# Patient Record
Sex: FEMALE | Race: WHITE
[De-identification: names, ages, dates, MRNs, and addresses within clinical notes are randomized per-mention and may not be internally consistent; named-entity substitution may affect disease eponyms.]

---

## 2017-01-18 ENCOUNTER — HOSPITAL ENCOUNTER (EMERGENCY)
Dept: HOSPITAL 62 - ER | Age: 37
Discharge: HOME | End: 2017-01-18
Payer: OTHER GOVERNMENT

## 2017-01-18 VITALS — SYSTOLIC BLOOD PRESSURE: 113 MMHG | DIASTOLIC BLOOD PRESSURE: 75 MMHG

## 2017-01-18 DIAGNOSIS — Z3A.13: ICD-10-CM

## 2017-01-18 DIAGNOSIS — M54.2: ICD-10-CM

## 2017-01-18 DIAGNOSIS — O99.89: ICD-10-CM

## 2017-01-18 DIAGNOSIS — Z88.0: ICD-10-CM

## 2017-01-18 DIAGNOSIS — R22.0: ICD-10-CM

## 2017-01-18 DIAGNOSIS — O92.29: ICD-10-CM

## 2017-01-18 DIAGNOSIS — O26.891: Primary | ICD-10-CM

## 2017-01-18 DIAGNOSIS — R51: ICD-10-CM

## 2017-01-18 DIAGNOSIS — R10.814: ICD-10-CM

## 2017-01-18 DIAGNOSIS — H92.01: ICD-10-CM

## 2017-01-18 DIAGNOSIS — R10.9: ICD-10-CM

## 2017-01-18 DIAGNOSIS — R19.4: ICD-10-CM

## 2017-01-18 LAB
ALBUMIN SERPL-MCNC: 4.2 G/DL (ref 3.5–5)
ALP SERPL-CCNC: 85 U/L (ref 38–126)
ALT SERPL-CCNC: 37 U/L (ref 9–52)
ANION GAP SERPL CALC-SCNC: 13 MMOL/L (ref 5–19)
APPEARANCE UR: CLEAR
AST SERPL-CCNC: 28 U/L (ref 14–36)
BASOPHILS # BLD AUTO: 0 10^3/UL (ref 0–0.2)
BASOPHILS NFR BLD AUTO: 0.4 % (ref 0–2)
BILIRUB DIRECT SERPL-MCNC: 0 MG/DL (ref 0–0.3)
BILIRUB SERPL-MCNC: 0.4 MG/DL (ref 0.2–1.3)
BILIRUB UR QL STRIP: NEGATIVE
BUN SERPL-MCNC: 5 MG/DL (ref 7–20)
CALCIUM: 9.4 MG/DL (ref 8.4–10.2)
CHLORIDE SERPL-SCNC: 100 MMOL/L (ref 98–107)
CO2 SERPL-SCNC: 26 MMOL/L (ref 22–30)
CREAT SERPL-MCNC: 0.51 MG/DL (ref 0.52–1.25)
EOSINOPHIL # BLD AUTO: 0.3 10^3/UL (ref 0–0.6)
EOSINOPHIL NFR BLD AUTO: 3.1 % (ref 0–6)
ERYTHROCYTE [DISTWIDTH] IN BLOOD BY AUTOMATED COUNT: 13 % (ref 11.5–14)
GLUCOSE SERPL-MCNC: 96 MG/DL (ref 75–110)
GLUCOSE UR STRIP-MCNC: NEGATIVE MG/DL
HCT VFR BLD CALC: 39.3 % (ref 36–47)
HGB BLD-MCNC: 13.1 G/DL (ref 12–15.5)
HGB HCT DIFFERENCE: 0
KETONES UR STRIP-MCNC: 20 MG/DL
LIPASE SERPL-CCNC: 84.6 U/L (ref 23–300)
LYMPHOCYTES # BLD AUTO: 2 10^3/UL (ref 0.5–4.7)
LYMPHOCYTES NFR BLD AUTO: 20.6 % (ref 13–45)
MCH RBC QN AUTO: 30.7 PG (ref 27–33.4)
MCHC RBC AUTO-ENTMCNC: 33.3 G/DL (ref 32–36)
MCV RBC AUTO: 92 FL (ref 80–97)
MONOCYTES # BLD AUTO: 0.3 10^3/UL (ref 0.1–1.4)
MONOCYTES NFR BLD AUTO: 3.4 % (ref 3–13)
NEUTROPHILS # BLD AUTO: 7.1 10^3/UL (ref 1.7–8.2)
NEUTS SEG NFR BLD AUTO: 72.5 % (ref 42–78)
NITRITE UR QL STRIP: NEGATIVE
PH UR STRIP: 6 [PH] (ref 5–9)
POTASSIUM SERPL-SCNC: 3.7 MMOL/L (ref 3.6–5)
PROT SERPL-MCNC: 7.1 G/DL (ref 6.3–8.2)
PROT UR STRIP-MCNC: NEGATIVE MG/DL
RBC # BLD AUTO: 4.25 10^6/UL (ref 3.72–5.28)
SODIUM SERPL-SCNC: 138.6 MMOL/L (ref 137–145)
SP GR UR STRIP: 1.01
UROBILINOGEN UR-MCNC: NEGATIVE MG/DL (ref ?–2)
WBC # BLD AUTO: 9.8 10^3/UL (ref 4–10.5)

## 2017-01-18 PROCEDURE — 81001 URINALYSIS AUTO W/SCOPE: CPT

## 2017-01-18 PROCEDURE — 83690 ASSAY OF LIPASE: CPT

## 2017-01-18 PROCEDURE — 85025 COMPLETE CBC W/AUTO DIFF WBC: CPT

## 2017-01-18 PROCEDURE — 80053 COMPREHEN METABOLIC PANEL: CPT

## 2017-01-18 PROCEDURE — 99284 EMERGENCY DEPT VISIT MOD MDM: CPT

## 2017-01-18 PROCEDURE — 36415 COLL VENOUS BLD VENIPUNCTURE: CPT

## 2017-01-18 NOTE — ER DOCUMENT REPORT
ED Medical Screen (RME)





- General


Stated Complaint: RIGHT EAR PAIN,SIDE PAIN,LOOSE STOOLS


Mode of Arrival: Ambulatory


Information source: Patient


Notes: 


C/O abdominal pain with loose stools and right ear pain and swollen lymph node 

for the past couple days.  Denies fever vomiting.  Patient is 13 weeks 

pregnant.  Patient is due in , .  Denies vaginal bleeding, denies pain 

with void. Stools have foul smell order, denies recent antibiotic use,no 

history of C. Diff.





I have greeted and performed a rapid initial assessment of this patient.  A 

comprehensive ED assessment and evaluation of the patient, analysis of test 

results and completion of the medical decision making process will be conducted 

by additional ED providers.


TRAVEL OUTSIDE OF THE [place] IN LAST 30 DAYS: No





- Related Data


Allergies/Adverse Reactions: 


 





Penicillins Allergy (Verified 16 16:01)


 











Past Medical History


Neurological Medical History: Reports: Hx Seizures


Psychiatric Medical History: Reports: Hx Depression


Past Surgical History: Reports: Hx Gynecologic Surgery - right ovary tumor 

removed





Physical Exam





- Vital signs


Vitals: 





 











Temp Pulse Resp BP Pulse Ox


 


 98.1 F   88   18   113/71   98 


 


 17 13:52  17 13:52  17 13:52  17 13:52  17 13:52














Course





- Vital Signs


Vital signs: 





 











Temp Pulse Resp BP Pulse Ox


 


 98.1 F   88   18   113/71   98 


 


 17 13:52  17 13:52  17 13:52  17 13:52  17 13:52

## 2017-01-18 NOTE — ER DOCUMENT REPORT
ED General





- General


Chief Complaint: Abdominal Pain


Stated Complaint: RIGHT EAR PAIN,SIDE PAIN,LOOSE STOOLS


Time seen by provider: 16:18


Mode of Arrival: Ambulatory


Notes: 


This is a  female that presents today with loose stools, left abdominal pain

, and right sided facial pain.  She is 13 weeks pregnant.  She states that 4 

days ago she felt like she had mastitis of the right breast, although she 

stopped breast-feeding 5 months ago.  She states that her right breast was 

tender and swollen and had sharp pains 4 days ago however today she only admits 

to mild tenderness.  Denies any nipple discharge.  She states that she uses 

natural oils and she says she feels better.  She was diagnosed with mastitis of 

the same breast 8 months ago and was put on clarithromycin. Furthermore, she is 

also complaining of left abdominal pain dull 4 out of 10 constant since 

yesterday morning.  She states that she's been having loose stools since then 

also.  Denies any hematochezia.  Finally, she is also complaining of right neck 

pain that extends from behind the right ear down to the right lower mandible.  

Pain is characterized as dull and intermittently sharp 4 out of 10 constant 

that started at 0600 this morning.  She denies any vaginal discharge or itch.


TRAVEL OUTSIDE OF THE [place] IN LAST 30 DAYS: No





- Related Data


Allergies/Adverse Reactions: 


 





Penicillins Allergy (Verified 17 14:03)


 











Past Medical History





- General


Information source: Patient





- Social History


Smoking Status: Never Smoker


Chew tobacco use (# tins/day): No


Frequency of alcohol use: None


Drug Abuse: None


Family History: None


Patient has suicidal ideation: No


Patient has homicidal ideation: No


Neurological Medical History: Reports: Hx Seizures


Renal/ Medical History: Denies: Hx Peritoneal Dialysis


Psychiatric Medical History: Reports: Hx Depression


Past Surgical History: Reports: Hx Gynecologic Surgery - right ovary tumor 

removed





Review of Systems





- Review of Systems


Constitutional: denies: Chills, Fever


EENT: See HPI


Cardiovascular: No symptoms reported.  denies: Chest pain


Respiratory: No symptoms reported.  denies: Cough, Hurts to breathe


Gastrointestinal: See HPI


Genitourinary: No symptoms reported.  denies: Burning, Dysuria


Female Genitourinary: Pregnant


Musculoskeletal: No symptoms reported


Skin: No symptoms reported


Hematologic/Lymphatic: No symptoms reported





Physical Exam





- Vital signs


Vitals: 


 











Temp Pulse Resp BP Pulse Ox


 


 98.1 F   88   18   113/71   98 


 


 17 13:52  17 13:52  17 13:52  17 13:52  17 13:52














- General


General appearance: Appears well, Alert





- HEENT


Head: Normocephalic, Atraumatic


Eyes: Normal


Conjunctiva: Normal


Ears: Normal - Preauricular tenderness


Neck: Normal - Small nodule felt under the base of the right lower jaw.  Normal 

skin color no erythema noted no swelling noted.





- Respiratory


Respiratory status: No respiratory distress


Chest status: Nontender


Breath sounds: Normal.  No: Rales, Rhonchi, Stridor, Wheezing





- Cardiovascular


Rhythm: Regular


Heart sounds: Normal auscultation





- Abdominal


Inspection: Normal


Bowel sounds: Normal


Tenderness: Tender - Left lower quadrant tenderness to deep palpation





- Back


Back: Normal.  No: CVA tenderness





- Extremities


General upper extremity: Normal inspection, Nontender, Normal ROM, Normal 

strength


General lower extremity: Normal inspection, Nontender, Normal ROM, Normal 

strength





- Neurological


Cognition: Normal.  No: Confused





- Psychological


Associated symptoms: Normal affect, Normal mood





- Skin


Skin Temperature: Warm


Skin Moisture: Dry


Skin Color: Normal





Course





- Re-evaluation


Re-evalutation: 


17 17:05


Patient currently denies any abdominal pain.  She says the only pain she feels 

is for right ear.  She denies chest pain shortness of breath fever or chills.  

She states that she has a follow-up appointment with Dr. Salinas at [facility] 

[facility].  She states that she has taken Keflex in the past with out 

any reaction.  Patient stated that she wanted to go home and did not want to 

wait for an abdominal ultrasound.  Vital signs were reviewed and are stable.





- Vital Signs


Vital signs: 


 











Temp Pulse Resp BP Pulse Ox


 


 97.8 F   78   19   113/75   99 


 


 17 17:33  17 17:33  17 17:33  17 17:33  17 17:33














- Laboratory


Result Diagrams: 


 17 14:14





 17 14:14


Laboratory results interpreted by me: 


 











  17





  14:14 14:14


 


BUN  5 L 


 


Creatinine  0.51 L 


 


Urine Ketones   20 H














Discharge





- Discharge


Clinical Impression: 


 Right ear pain





Condition: Stable


Disposition: HOME, SELF-CARE


Additional Instructions: 


Return to the emergency department if symptoms worsen.  Follow-up with primary 

care physician..


Prescriptions: 


Cephalexin Monohydrate [Keflex 250 mg Capsule] 250 mg PO BID #10 capsule


Referrals: 


AMMY ORTIZ MD [Primary Care Provider] - Follow up as needed

## 2017-04-04 ENCOUNTER — HOSPITAL ENCOUNTER (OUTPATIENT)
Dept: HOSPITAL 62 - LC | Age: 37
Discharge: HOME | End: 2017-04-04
Attending: OBSTETRICS & GYNECOLOGY
Payer: OTHER GOVERNMENT

## 2017-04-04 DIAGNOSIS — J06.9: ICD-10-CM

## 2017-04-04 DIAGNOSIS — Z3A.23: ICD-10-CM

## 2017-04-04 DIAGNOSIS — O99.512: Primary | ICD-10-CM

## 2017-04-04 DIAGNOSIS — O09.522: ICD-10-CM

## 2017-04-04 LAB
APPEARANCE UR: (no result)
BARBITURATES UR QL SCN: NEGATIVE
BASOPHILS # BLD AUTO: 0 10^3/UL (ref 0–0.2)
BASOPHILS NFR BLD AUTO: 0.3 % (ref 0–2)
BILIRUB UR QL STRIP: NEGATIVE
EOSINOPHIL # BLD AUTO: 0.2 10^3/UL (ref 0–0.6)
EOSINOPHIL NFR BLD AUTO: 2.5 % (ref 0–6)
ERYTHROCYTE [DISTWIDTH] IN BLOOD BY AUTOMATED COUNT: 13.6 % (ref 11.5–14)
GLUCOSE UR STRIP-MCNC: >=500 MG/DL
HCT VFR BLD CALC: 30 % (ref 36–47)
HGB BLD-MCNC: 10.4 G/DL (ref 12–15.5)
HGB HCT DIFFERENCE: 1.2
KETONES UR STRIP-MCNC: (no result) MG/DL
LYMPHOCYTES # BLD AUTO: 1.1 10^3/UL (ref 0.5–4.7)
LYMPHOCYTES NFR BLD AUTO: 13.8 % (ref 13–45)
MCH RBC QN AUTO: 30.7 PG (ref 27–33.4)
MCHC RBC AUTO-ENTMCNC: 34.6 G/DL (ref 32–36)
MCV RBC AUTO: 89 FL (ref 80–97)
METHADONE UR QL SCN: NEGATIVE
MONOCYTES # BLD AUTO: 0.3 10^3/UL (ref 0.1–1.4)
MONOCYTES NFR BLD AUTO: 3.8 % (ref 3–13)
NEUTROPHILS # BLD AUTO: 6.6 10^3/UL (ref 1.7–8.2)
NEUTS SEG NFR BLD AUTO: 79.6 % (ref 42–78)
NITRITE UR QL STRIP: NEGATIVE
PCP UR QL SCN: NEGATIVE
PH UR STRIP: 6 [PH] (ref 5–9)
PROT UR STRIP-MCNC: NEGATIVE MG/DL
RBC # BLD AUTO: 3.37 10^6/UL (ref 3.72–5.28)
SP GR UR STRIP: 1.02
URINE OPIATES LOW: NEGATIVE
UROBILINOGEN UR-MCNC: NEGATIVE MG/DL (ref ?–2)
WBC # BLD AUTO: 8.3 10^3/UL (ref 4–10.5)

## 2017-04-04 PROCEDURE — 81001 URINALYSIS AUTO W/SCOPE: CPT

## 2017-04-04 PROCEDURE — 80307 DRUG TEST PRSMV CHEM ANLYZR: CPT

## 2017-04-04 PROCEDURE — 36415 COLL VENOUS BLD VENIPUNCTURE: CPT

## 2017-04-04 PROCEDURE — 85025 COMPLETE CBC W/AUTO DIFF WBC: CPT

## 2017-04-04 PROCEDURE — 94640 AIRWAY INHALATION TREATMENT: CPT

## 2017-04-04 PROCEDURE — 4A1HXCZ MONITORING OF PRODUCTS OF CONCEPTION, CARDIAC RATE, EXTERNAL APPROACH: ICD-10-PCS | Performed by: OBSTETRICS & GYNECOLOGY

## 2017-04-10 NOTE — ANTEPARTUM DISCHARGE SUMMARY
=================================================================

Antepartum DC

=================================================================

Datetime Report Generated by CPN: 04/10/2017 12:45

   

   

=================================================================

DIET/ACTIVITY/RESTRICTIONS

=================================================================

   

Diet:  Regular    (04/04/2017 17:55:Lisa Lovell, RN)

Activity:  Normal Activity    (04/04/2017 17:55:Lisa Lovell,

   RN)

   

=================================================================

TEACHING/INSTRUCTIONS/REFERRALS

=================================================================

   

Instructions Understood:  Patient Verbalized Understanding; Support

   Person Verbalized Understanding    (04/04/2017 17:55:Lisa Lovell, RN)

Referrals:  None    (04/04/2017 17:55:Lisa Lovell RN)

Educational Materials- Other:  [facility] CARE NOTES GIVEN TO PT WITH

   PRESCRIPTIONS- QUESTIONS ANSWERED.    (04/04/2017 17:55:Lisa Lovell RN)

   

=================================================================

DISCHARGE INFORMATION

=================================================================

   

Discharged AMA:  No    (04/04/2017 17:55:Lisa Lovell RN)

Discharge Date/Time:  04/04/2017 17:55    (04/04/2017 17:55:Lisa Lovell RN)

Discharged To:  Home    (04/04/2017 17:55:Lisa Lovell RN)

Discharge Provider Name:  DR HAWTHORNE    (04/04/2017 17:55:Lisa Lovell RN)

Accompanied By:  FAMILY    (04/04/2017 17:55:Lisa Lovell RN)

Discharge Method:  Ambulatory    (04/04/2017 17:55:Lisa Lovell RN)

Condition:  Stable    (04/04/2017 17:55:Lisa Lovell RN)

   

=================================================================

FOLLOW UP INFORMATION

=================================================================

   

Follow Up With:  [facility]    (04/04/2017

   17:55:Lisa Lovell RN)

Follow Up On:  As Scheduled    (04/04/2017 17:55:Lisa Lovell RN)

Follow Up Phone Number:  [facility] - [phone]

   (04/04/2017 17:55:Lisa Lovell RN)

## 2017-04-10 NOTE — L&D DISCHARGE SUMMARY
=================================================================

OB Discharge Summary

=================================================================

Datetime Report Generated by CPN: 04/10/2017 15:45

   

   

=================================================================

DISCHARGE DIAGNOSIS

=================================================================

   

Diagnosis/Symptoms:  Other

Diagnoses/Symptoms Other:  URI; NOT IN LABOR

Gestation:  23.5

Number of Babies in Womb:  1

Parity:  4

   

=================================================================

DIET/ACTIVITY/RESTRICTIONS

=================================================================

   

Diet:  Regular

Activity:  Normal Activity

   

=================================================================

TEACHING/INSTRUCTIONS/REFERRALS

=================================================================

   

Instructions Understood:  Patient Verbalized Understanding; Support

   Person Verbalized Understanding

Referrals:  None

Educational Materials- Other:  [facility] CARE NOTES GIVEN TO PT WITH

   PRESCRIPTIONS- QUESTIONS ANSWERED.

   

=================================================================

DISCHARGE INFORMATION

=================================================================

   

Discharged AMA:  No

Discharge Date/Time:  04/04/2017 17:55

Discharged To:  Home

Discharge Provider Name:  DR MARINE

Accompanied By:  FAMILY

Discharge Method:  Ambulatory

Condition:  Stable

   

=================================================================

FOLLOW UP INFORMATION

=================================================================

   

Follow Up With:  [facility]

Follow Up On:  As Scheduled

Follow Up Phone Number:  [facility] - [phone]

## 2017-04-10 NOTE — L&D GENERAL ADMISSION
=================================================================

General Admit

=================================================================

Datetime Report Generated by N: 04/10/2017 12:46

   

   

=================================================================

PREGNANCY INFORMATION

=================================================================

   

Patient Age:  37    (2017 13:01:QS system process)

EDC:  2017 00:00    (2017 13:05:Lisa Lovell RN)

:  6    (2017 13:05:Lisa Lovell RN)

Para:  4    (2017 13:05:Lisa Lovell RN)

Term:  3    (2017 13:05:Lisa Lovell RN)

:  1    (2017 13:05:Lisa Lovell RN)

Spontaneous Abortions:  0    (2017 13:05:Lisa Lovell RN)

Induced Abortions:  1    (2017 13:05:Lisa Lovell RN)

Livin    (2017 13:05:Lisa Lovell RN)

Cesareans:  0    (2017 13:05:Lisa Lovell RN)

VBACs:  0    (2017 13:05:Lisa Lovell RN)

Ectopic:  0    (2017 13:05:Lisa Lovell RN)

Multiple Births:  0    (2017 13:05:Lisa Lovell RN)

Baby, Number in Womb:  1    (2017 13:05:Lisa Lovell RN)

   

=================================================================

PRENATAL CARE

=================================================================

   

Primary Obstetrician:  [facility]    (2017

   13:05:Lisa Lovell RN)

Height (in):  62    (2017 16:28:QS system process)

Height (in):  62    (2017 13:32:QS system process)

   

=================================================================

ALLERGIES

=================================================================

   

Medication Allergy:  Yes    (2017 13:05:Lisa Lovell RN)

Medication Allergies:  Penicillins (2017)    (2017 13:32:QS

   system process)

Medication Allergies:  Penicillins (2017)    (2017 13:01:QS

   system process)

Latex Allergy:  No Latex Allergies    (2017 13:05:Lisa Lovell RN)

Food Allergies:  NONE    (2017 13:05:Lisa Lovell RN)

Environmental Allergies:  CATS, SEASONAL    (2017 13:05:Lisa Lovell RN)

   

=================================================================

COMMUNICATION

=================================================================

   

Primary Language:  English    (2017 13:05:Lisa Lovell RN)

Medical Tx Preferred Language:  English    (2017 13:05:Lisa Lovell RN)

Communication Barrier(s):  None    (2017 13:05:Lisa Lovell RN)

   

=================================================================

DEMOGRAPHICS

=================================================================

   

Address:  [address] [place] [address]   [id number]    (2017 13:01:QS system process)

Zipcode:  [address]    (2017 13:01:QS system process)

Home Telephone:  [phone]    (2017 13:01:QS system process)

SSN:      (2017 13:01:QS system process)

Next of Kin Name:  AMANDA EUCEDA    (2017 13:01:QS system

   process)

Next of Kin Phone:  [phone]    (2017 13:01:QS system

   process)

Next of Kin Relationship:  SPO    (2017 13:01:QS system process)

YOB: 1980    (2017 13:01:QS system process)

Marital Status:      (2017 13:01:QS system process)

Sex:  Female    (2017 13:01:QS system process)

Race:      (2017 13:01:QS system process)

Ethnicity:  Non- or     (2017 13:01:QS system

   process)

Roman Catholic:  [employer]ism    (2017 13:01:QS system process)

   

=================================================================

PRENATAL LABS

=================================================================

   

Blood Type:  A Positive    (2017 13:05:Lisa Lovell RN)

Antibody Screen:  NEGATIVE    (2017 13:05:Lisa Lovell RN)

Hemoglobin:  10.4 L    (2017 14:24:QS system process)

Hematocrit:  30.0 L    (2017 14:24:QS system process)

MCV:  89    (2017 14:24:QS system process)

RPR/VDRL:  Nonreactive    (2017 13:05:Lisa Lovell RN)

HIV Exposure Test:  Negative    (2017 13:05:Lisa Lovell RN)

Hepatitis B:  Negative    (2017 13:05:Lisa Lovell RN)

Rubella:  Immune    (2017 13:05:Lisa Lovell RN)

## 2017-04-10 NOTE — L&D ADMISSION ASSESSMENT
=================================================================

LD ADM ASMT

=================================================================

Datetime Report Generated by CPN: 04/10/2017 12:46

   

   

=================================================================

PATIENT ASSESSMENT

=================================================================

   

Assessment Type:  Triage    (04/04/2017 13:45:Lisa Shelly Roulund, RN)

   

=================================================================

WEIGHT

=================================================================

   

Weight (lb):  202    (04/04/2017 13:32:QS system process)

Weight (kg):  91.8    (04/04/2017 13:32:QS system process)

   

=================================================================

PAIN

=================================================================

   

Pain Scale:  2    (04/04/2017 13:45:Lisa Lovell RN)

Pain Presence:  Constant    (04/04/2017 13:45:Lisa Lovell RN)

Pain Type:  Dull; Ache    (04/04/2017 13:45:Lisa Lovell RN)

Pain Location:  Abdomen; Back; Head    (04/04/2017 13:45:Lisa Lovell RN)

Pain Goal:  1    (04/04/2017 13:45:Lisa Lovell RN)

Pain Related to Contraction:  No    (04/04/2017 13:45:Lisa Lovell RN)

   

=================================================================

VAGINAL EXAM

=================================================================

   

Membranes Status:  Intact    (04/04/2017 13:45:Lisa Lovell RN)

   

=================================================================

NEURO

=================================================================

   

Level of Consciousness:  Fully Conscious    (04/04/2017 13:45:Lisa Lovell RN)

DTR's/Clonus:  DTRs 1+; No Clonus    (04/04/2017 13:45:Lisa Lovell RN)

Headache:  Frontal (Annotations: INCREASES WHEN BENDING FORWARD)   

   (04/04/2017 13:45:Lisa Lovell RN)

Dizziness:  Yes    (04/04/2017 13:45:Lisa Lovell RN)

Blurred Vision:  No    (04/04/2017 13:45:Lisa Lovell RN)

Extremity Numbness/Tingling :  None    (04/04/2017 13:45:Lisa Lovell RN)

Extremity Movement:  Full Range of Motion    (04/04/2017 13:45:Lisa Lovell RN)

   

=================================================================

CARDIOVASCULAR

=================================================================

   

Heart Rhythm:  Regular    (04/04/2017 13:45:Lisa Lovell RN)

Nailbeds:  Pink    (04/04/2017 13:45:Lisa Lovell RN)

Capillary Refill:  Less than 3 Seconds    (04/04/2017 13:45:Lisa Lovell RN)

Lower Extremities Edema:  Bilateral Lower Extremities    (04/04/2017

   13:45:Lisa Lovell RN)

Lower Extremities Edema Degree:  1+    (04/04/2017 13:45:Lisa Lovell RN)

Upper Extremities Edema:  None    (04/04/2017 13:45:Lisa Lovell RN)

Upper Extremities Edema Degree:  None    (04/04/2017 13:45:Lisa Lovell RN)

Facial Edema:  None    (04/04/2017 13:45:Lisa Lovell RN)

   

=================================================================

RESPIRATORY

=================================================================

   

Respiratory Effort:  Labored; Equal Expansion    (04/04/2017

   13:45:Lisa Lovell RN)

Breath Sounds, Left:  Diminished; Crackles    (04/04/2017 13:45:Lisa Lovell RN)

Breath Sounds, Right:  Diminished; Crackles    (04/04/2017

   13:45:Lisa Lovell RN)

Cough Productivity:  Productive    (04/04/2017 13:45:Lisa Lovell RN)

   

=================================================================

GASTROINTESTINAL

=================================================================

   

Nausea/Vomiting:  Denies    (04/04/2017 13:45:Lisa Lovell RN)

Bowel Patterns:  Soft, Formed Stool    (04/04/2017 13:45:Lisa Lovell RN)

Hemorrhoids:  None    (04/04/2017 13:45:Lisa Lovell RN)

Diet Type:  Regular diet    (04/04/2017 13:45:Lisa Lovell RN)

Last Meal:  04/04/2017 08:00    (04/04/2017 13:45:Lisa Lovell RN)

   

=================================================================

GENITOURINARY

=================================================================

   

Bladder:  Nondistended    (04/04/2017 13:45:Lisa Lovell RN)

Frequency of Urination:  No    (04/04/2017 13:45:Lisa Lovell RN)

Urination Burning:  No    (04/04/2017 13:45:Lisa Lovell RN)

Vaginal Bleeding:  None    (04/04/2017 13:45:Lisa Lovell RN)

Vaginal Discharge Amount:  Small    (04/04/2017 13:45:Lisa Lovell RN)

Vaginal Discharge Color:  MUCUS    (04/04/2017 13:45:Lisa Lovell RN)

Vaginal Discharge Odor:  Non-Odorous    (04/04/2017 13:45:Lisa Lovell RN)

Vaginal Discharge Character:  Thick    (04/04/2017 13:45:Lisa Lovell RN)

   

=================================================================

INTEGUMENTARY

=================================================================

   

Skin Color:  Normal for Race    (04/04/2017 13:45:Lisa Lovell RN)

Skin Temperature:  Warm    (04/04/2017 13:45:Lisa Lovell RN)

Skin Moisture:  Dry    (04/04/2017 13:45:Lisa Lovell RN)

   

=================================================================

ROSY SKIN ASSESSMENT

=================================================================

   

Rosy Scale Sensory Perception:  No Impairment- Responds to verbal

   commands.  Has no sensory deficit which would limit ability to feel

   or voice pain or discomfort    (04/04/2017 13:45:Lisa Lovell RN)

Rosy Scale Moisture:  Rarely Moist- Skin is usually dry.  Linen only

   requires changing at routine intervals    (04/04/2017 13:45:Lisa Lovell RN)

Rosy Scale Activity:  Walks Frequently- Walks outside the room at

   least twice a day and inside room at least every 2 hours during the

   day.    (04/04/2017 13:45:Lisa Lovell RN)

Rosy Scale Mobility:  No Limitations- Makes major and frequent

   changes in position without assistance    (04/04/2017 13:45:Lisa Lovell RN)

Rosy Scale Nutrition:  Excellent- Eats most of every meal.  Never

   refuses a meal.  Usually eats a total of 4 or more servings of meat

   and dairy products.  Occasionally eats between meals.  Does not

   require supplementation    (04/04/2017 13:45:Lisa Lovell RN)

Rosy Scale Friction and Shear:  No Apparent Problem- Moves in bed and

   in chair independently and has sufficient muscle strength to lift up

   completely during move.  Maintains good position in bed or chair at

   all times    (04/04/2017 13:45:Lisa Lovell RN)

   

=================================================================

SUPPORT

=================================================================

   

Family Support:  Child(austin) visited    (04/04/2017 13:45:Lisa Lovell RN)

Emotional State:  Calm/Relaxed    (04/04/2017 13:45:Lisa Lovell RN)

   

=================================================================

SAFETY

=================================================================

   

Call Perry Within Reach:  Yes    (04/04/2017 13:45:Lisa Lovell RN)

Side Rails Up:  Yes    (04/04/2017 13:45:Lisa Lovell RN)

Bed Wheels Locked:  Yes    (04/04/2017 13:45:Lisa Lovell RN)

Arm Bands Present:  Yes    (04/04/2017 13:45:Lisa Lovell RN)

Isolation:  Universal    (04/04/2017 13:45:Lisa Lovell RN)

   

=================================================================

FALL SCREEN

=================================================================

   

Fall Risk History of Falling:  (0) No    (04/04/2017 13:45:Lisa Lovell RN)

Fall Risk Secondary Diagnosis:  (0) No    (04/04/2017 13:45:Lisa Lovell RN)

Fall Risk Ambulatory Aid:  (0) None/Bedrest/Wheelchair/Nurse Assist   

   (04/04/2017 13:45:Lisa Lovell RN)

Fall Risk IV Therapy:  (0) No    (04/04/2017 13:45:Lisa Lovell RN)

Fall Risk Gait:  (0) Normal/Bedrest/Immobile    (04/04/2017

   13:45:Lisa Lovell RN)

Fall Risk Mental Status:  (0) Oriented to Own Ability    (04/04/2017

   13:45:Lisa Lovell RN)

   

=================================================================

RECENT TRAVEL/INFECTIOUS DISEASE

=================================================================

   

Recent Exp Communicable Disease:  No    (04/04/2017 13:45:Lisa Lovell RN)

Cough or Fever:  No    (04/04/2017 13:45:Lisa Lovell RN)

Foreign Travel Past 10 Days:  No    (04/04/2017 13:45:Lisa Lovell RN)

Open Wounds or Sores:  No    (04/04/2017 13:45:Lisa Lovell RN)

Prior Antibiotic Resistance Tx:  No    (04/04/2017 13:45:Lisa Lovell RN)

Cultures Obtained:  Not Applicable    (04/04/2017 13:45:Lisa Lovell RN)

Isolation Initiated:  No    (04/04/2017 13:45:Lisa Lovell RN)

Pt/Family Education:  Not Applicable    (04/04/2017 13:45:Lisa Lovell RN)

## 2017-04-10 NOTE — L&D DISCHARGE SUMMARY
=================================================================

OB Discharge Summary

=================================================================

Datetime Report Generated by CPN: 04/10/2017 12:46

   

   

=================================================================

DISCHARGE DIAGNOSIS

=================================================================

   

Diagnosis/Symptoms:  Other

Diagnoses/Symptoms Other:  URI; NOT IN LABOR

Gestation:  23.5

Number of Babies in Womb:  1

Parity:  4

   

=================================================================

DIET/ACTIVITY/RESTRICTIONS

=================================================================

   

Diet:  Regular

Activity:  Normal Activity

   

=================================================================

TEACHING/INSTRUCTIONS/REFERRALS

=================================================================

   

Instructions Understood:  Patient Verbalized Understanding; Support

   Person Verbalized Understanding

Referrals:  None

Educational Materials- Other:  [facility] CARE NOTES GIVEN TO PT WITH

   PRESCRIPTIONS- QUESTIONS ANSWERED.

   

=================================================================

DISCHARGE INFORMATION

=================================================================

   

Discharged AMA:  No

Discharge Date/Time:  04/04/2017 17:55

Discharged To:  Home

Discharge Provider Name:  DR MARINE

Accompanied By:  FAMILY

Discharge Method:  Ambulatory

Condition:  Stable

   

=================================================================

FOLLOW UP INFORMATION

=================================================================

   

Follow Up With:  [facility]

Follow Up On:  As Scheduled

Follow Up Phone Number:  [facility] - [phone]

## 2017-04-10 NOTE — L&D FLOW SHEET
=================================================================

LD Flowsheet

=================================================================

Datetime Report Generated by CPN: 04/10/2017 12:46

   

   

=================================================================

Datetime: 04/04/2017 17:41

=================================================================

   

   

=================================================================

Vital Signs

=================================================================

   

Stage of Pregnancy:  OB Triage (Lisa Lovell RN)

 NBP Sys/Diane/Mean (mmHg):  113 (QS system process)

:  66 (QS system process)

:  85 (QS system process)

 Pulse:  95 (QS system process)

 Respirations:  20 (Lisa Lovell RN)

   

=================================================================

Uterine Activity

=================================================================

   

 Monitor Mode:  External (Lisa Lovell RN)

 Monitor Interventions for UA:  [place] Adjusted (Lisa Lovell RN)

 Frequency (min):  NONE (Lisa Lovell RN)

 Resting Tone (Palpate):  Relaxed (Lisa Lovell RN)

   

=================================================================

Pain

=================================================================

   

 Pain Scale:  2 (Lisa Lovell RN)

 Pain Presence:  Intermittent (Lisa Lovell RN)

 Pain Type:  Dull; Ache (Lisa Lovell RN)

 Pain Location:  Abdomen; Back (Lisa Lovell RN)

 Pain Relief Measures:  Comfort Measures (Lisa Lovell RN)

 Patient Position/Activity:  Left Tilt; Low Fowlers (Lisa Lovell RN)

 Comfort Measures:  Family Support (Lisa Lovell RN)

 Patient Care Comments:  IV D/C- DSG APPLIED (Lisa Lovell RN)

   

=================================================================

Communication

=================================================================

   

 Communication:  RN at Bedside; RN Reviewed Strip (Lisa Lovell, RN)

 Communication Comments:  D/C HOME ACCOMPANIED BY FAMILY. (Lisa Lovell, RN)

 LaborFlag:  OB Triage (QS system process)

   

=================================================================

Datetime: 04/04/2017 17:11

=================================================================

   

 NBP Sys/Diane/Mean (mmHg):  117 (QS system process)

:  70 (QS system process)

:  88 (QS system process)

 Pulse:  88 (QS system process)

 LaborFlag:  OB Triage (QS system process)

   

=================================================================

Datetime: 04/04/2017 17:10

=================================================================

   

   

=================================================================

Vital Signs

=================================================================

   

Stage of Pregnancy:  OB Triage (Lisa Lovell, RN)

   

=================================================================

Patient Care

=================================================================

   

 IV/Blood Work:  New IV Bag Hung (Lisa Lovell, RN)

   

=================================================================

Teaching

=================================================================

   

 Instructional Method:  Verbal; Written; Patient Instructed;

   Family/Support Person Instructed; Verbalized Understanding (Lisa Lovell RN)

 Plan of Care:  Plan of Care Discussed (Lisa Lovell RN)

 Pain Management:  Pain Scale/Goals; Comfort Measures (Lisa Lovell RN)

 Pregnancy Related:  Common Discomforts of Pregnancy; Maternal Physical

   Changes; Maternal Emotional Changes; Nutrition; Hydration; Activity

   and Rest (Lisa Lovell, RN)

 Teaching Comments:  [facility] CARE NOTES (Lisa Lovell, RN)

   

=================================================================

Communication

=================================================================

   

 Communication:  RN at Bedside; RN Reviewed Strip (Lisa Lovell, RN)

   

=================================================================

Datetime: 04/04/2017 16:45

=================================================================

   

   

=================================================================

Vital Signs

=================================================================

   

Stage of Pregnancy:  OB Triage (Lisa Lovell, RN)

 I/O Interventions:  Up to BR (Lisa Lovell, RN)

   

=================================================================

Communication

=================================================================

   

 Communication:  RN at Bedside (Lisa Lovell, RN)

   

=================================================================

Datetime: 04/04/2017 16:41

=================================================================

   

   

=================================================================

Vital Signs

=================================================================

   

Stage of Pregnancy:  OB Triage (Lisa Lovell, RN)

 NBP Sys/Diane/Mean (mmHg):  115 (QS system process)

:  68 (QS system process)

:  87 (QS system process)

 Pulse:  82 (QS system process)

 Respirations:  20 (Lisa Lovell, RN)

   

=================================================================

Uterine Activity

=================================================================

   

 Monitor Mode:  External (Lisa Lovell RN)

 Frequency (min):  NONE (Lisa Lovell RN)

 Resting Tone (Palpate):  Relaxed (Lisa Lovell RN)

 Pain Presence:  Constant (Lisa Lovell RN)

 Pain Type:  Dull; Ache (Lisa Lovell RN)

 Pain Location:  Abdomen; Back (Lisa Lovell RN)

 Pain Relief Measures:  Comfort Measures (Lisa Lovell RN)

 Pain Assessment Comments:  PT REPORTS SOB IMPROVED, FEELING BETTER @

   PRESENT (Lisa Lovell RN)

 Breath Sounds, Left:  Wheezes (Lisa Lovell RN)

 Breath Sounds, Right:  Wheezes (Lisa Lovell RN)

 Patient Position/Activity:  Left Tilt; Low Fowlers (Lisa Lovell RN)

   

=================================================================

Communication

=================================================================

   

 Communication:  RN at Bedside; RN Reviewed Strip; Provider Orders

   Received; Report Given to @ DR HAWTHORNE (Lisa Lovell RN)

 Notification Reason:  Status Update; Lab/Diagnostic Study (RAYNE Strodu

 LaborFlag:  OB Triage (QS system process)

   

=================================================================

Datetime: 04/04/2017 16:16

=================================================================

   

 Pulse:  95 (QS system process)

 SpO2 (%):  98 (QS system process)

 LaborFlag:  OB Triage (QS system process)

   

=================================================================

Datetime: 04/04/2017 16:11

=================================================================

   

 NBP Sys/Diane/Mean (mmHg):  110 (QS system process)

:  65 (QS system process)

:  82 (QS system process)

 Pulse:  98 (QS system process)

 Pulse:  93 (QS system process)

 SpO2 (%):  97 (QS system process)

 LaborFlag:  OB Triage (QS system process)

   

=================================================================

Datetime: 04/04/2017 16:06

=================================================================

   

 Pulse:  98 (QS system process)

 SpO2 (%):  97 (QS system process)

 LaborFlag:  OB Triage (QS system process)

   

=================================================================

Datetime: 04/04/2017 16:01

=================================================================

   

 Pulse:  104 (QS system process)

 SpO2 (%):  99 (QS system process)

 LaborFlag:  OB Triage (QS system process)

   

=================================================================

Datetime: 04/04/2017 15:56

=================================================================

   

 Pulse:  98 (QS system process)

 SpO2 (%):  98 (QS system process)

 LaborFlag:  OB Triage (QS system process)

   

=================================================================

Datetime: 04/04/2017 15:51

=================================================================

   

 Pulse:  110 (QS system process)

 SpO2 (%):  96 (QS system process)

 LaborFlag:  OB Triage (QS system process)

   

=================================================================

Datetime: 04/04/2017 15:42

=================================================================

   

   

=================================================================

Vital Signs

=================================================================

   

Stage of Pregnancy:  OB Triage (Lisa Shelly Roulund, RN)

   

=================================================================

Medications

=================================================================

   

 Antibiotics:  Clindamycin  mg (Lisa Lovell, RN)

   

=================================================================

Patient Care

=================================================================

   

 IV/Blood Work:  IV Started; IV Bolus Started (Lisa Lovell, RN)

   

=================================================================

Communication

=================================================================

   

 Communication:  RN at Bedside; RN Reviewed Strip (Lisa Lovell, RN)

   

=================================================================

Datetime: 04/04/2017 15:41

=================================================================

   

 NBP Sys/Diane/Mean (mmHg):  109 (QS system process)

:  67 (QS system process)

:  82 (QS system process)

 Pulse:  96 (QS system process)

 Pulse:  90 (QS system process)

 SpO2 (%):  98 (QS system process)

 LaborFlag:  OB Triage (QS system process)

   

=================================================================

Datetime: 04/04/2017 15:36

=================================================================

   

 Pulse:  97 (QS system process)

 SpO2 (%):  100 (QS system process)

 LaborFlag:  OB Triage (QS system process)

   

=================================================================

Datetime: 04/04/2017 15:17

=================================================================

   

 Pulse:  94 (QS system process)

 SpO2 (%):  100 (QS system process)

 LaborFlag:  OB Triage (QS system process)

   

=================================================================

Datetime: 04/04/2017 15:12

=================================================================

   

 Pulse:  96 (QS system process)

 SpO2 (%):  100 (QS system process)

 LaborFlag:  OB Triage (QS system process)

   

=================================================================

Datetime: 04/04/2017 15:11

=================================================================

   

 NBP Sys/Diane/Mean (mmHg):  114 (QS system process)

:  81 (QS system process)

:  91 (QS system process)

 Pulse:  100 (QS system process)

 Pulse:  101 (QS system process)

 SpO2 (%):  90 (QS system process)

 LaborFlag:  OB Triage (QS system process)

   

=================================================================

Datetime: 04/04/2017 15:07

=================================================================

   

   

=================================================================

Vital Signs

=================================================================

   

Stage of Pregnancy:  OB Triage (Lisa Lovell, RN)

 Respirations:  20 (Lisa Lovell, RN)

   

=================================================================

Uterine Activity

=================================================================

   

 Monitor Mode:  External; Palpation (Lisa Lovell, RN)

 Monitor Interventions for UA:  [place] Adjusted (Lisa Lovell, RN)

 Resting Tone (Palpate):  Relaxed (Lisa Lovell, CLIVE)

   

=================================================================

Fetal Assessment A

=================================================================

   

 Monitor Mode:  External US (Lisa Lovell, RN)

 Monitor Interventions for FHR:  Ultrasound Adjusted (Lisa Lovell, RN)

 FHR Baseline Rate :  155 (Lisa Lovell, RN)

 Patient Position/Activity:  Left Tilt; Low Fowlers (Lisa Lovell, RN)

   

=================================================================

Communication

=================================================================

   

 Communication:  RN at Bedside; RN Reviewed Strip (Lisa Lovell RN)

 Communication Comments:  SPOUSE @ BS. MONITORS APPLIED. (Lisa Lovell RN)

 LaborFlag:  OB Triage (QS system process)

   

=================================================================

Datetime: 04/04/2017 15:06

=================================================================

   

 Pulse:  98 (QS system process)

 SpO2 (%):  100 (QS system process)

 LaborFlag:  OB Triage (QS system process)

   

=================================================================

Datetime: 04/04/2017 15:01

=================================================================

   

 Pulse:  92 (QS system process)

 SpO2 (%):  100 (QS system process)

 LaborFlag:  OB Triage (QS system process)

   

=================================================================

Datetime: 04/04/2017 14:55

=================================================================

   

 Pulse:  96 (QS system process)

 SpO2 (%):  100 (QS system process)

 Medication Comments:  ALBUTEROL NEB TREATMENT BY  RESP THERAPY

   (Lisa Lovell RN)

 LaborFlag:  OB Triage (QS system process)

   

=================================================================

Datetime: 04/04/2017 14:50

=================================================================

   

 Pulse:  98 (QS system process)

 SpO2 (%):  100 (QS system process)

 LaborFlag:  OB Triage (QS system process)

   

=================================================================

Datetime: 04/04/2017 14:45

=================================================================

   

 Pulse:  94 (QS system process)

 SpO2 (%):  99 (QS system process)

 LaborFlag:  OB Triage (QS system process)

   

=================================================================

Datetime: 04/04/2017 14:40

=================================================================

   

 Pulse:  93 (QS system process)

 SpO2 (%):  99 (QS system process)

 LaborFlag:  OB Triage (QS system process)

   

=================================================================

Datetime: 04/04/2017 14:35

=================================================================

   

 Pulse:  96 (QS system process)

 SpO2 (%):  99 (QS system process)

 LaborFlag:  OB Triage (QS system process)

   

=================================================================

Datetime: 04/04/2017 14:28

=================================================================

   

 Pulse:  101 (QS system process)

 SpO2 (%):  100 (QS system process)

 LaborFlag:  OB Triage (QS system process)

   

=================================================================

Datetime: 04/04/2017 14:23

=================================================================

   

 Pulse:  100 (QS system process)

 SpO2 (%):  98 (QS system process)

 LaborFlag:  OB Triage (QS system process)

   

=================================================================

Datetime: 04/04/2017 14:17

=================================================================

   

 Pulse:  163 (QS system process)

 SpO2 (%):  87 (QS system process)

 SpO2 (%):  88 (QS system process)

 LaborFlag:  OB Triage (QS system process)

   

=================================================================

Datetime: 04/04/2017 14:12

=================================================================

   

 Pulse:  89 (QS system process)

 SpO2 (%):  100 (QS system process)

 LaborFlag:  OB Triage (QS system process)

   

=================================================================

Datetime: 04/04/2017 14:10

=================================================================

   

 Pulse:  99 (QS system process)

 SpO2 (%):  92 (QS system process)

 LaborFlag:  OB Triage (QS system process)

   

=================================================================

Datetime: 04/04/2017 14:07

=================================================================

   

 Pulse:  104 (QS system process)

 SpO2 (%):  100 (QS system process)

 LaborFlag:  OB Triage (QS system process)

   

=================================================================

Datetime: 04/04/2017 14:02

=================================================================

   

 Pulse:  103 (QS system process)

 SpO2 (%):  96 (QS system process)

 LaborFlag:  OB Triage (QS system process)

   

=================================================================

Datetime: 04/04/2017 13:56

=================================================================

   

 Pulse:  104 (QS system process)

 SpO2 (%):  98 (QS system process)

 LaborFlag:  OB Triage (QS system process)

   

=================================================================

Datetime: 04/04/2017 13:48

=================================================================

   

 Pulse:  111 (QS system process)

 SpO2 (%):  99 (QS system process)

 LaborFlag:  OB Triage (QS system process)

   

=================================================================

Datetime: 04/04/2017 13:45

=================================================================

   

   

=================================================================

Vital Signs

=================================================================

   

Stage of Pregnancy:  OB Triage (Lisa Freemannd, RN)

   

=================================================================

Pain

=================================================================

   

 Pain Scale:  2 (Lisa Viramontesjeri Lovell, RN)

 Pain Presence:  Constant (Lisa Shellyjeri Lovell, RN)

 Pain Type:  Dull; Ache (Lisa Lovell, RN)

 Pain Location:  Abdomen; Back; Head (Lisa Viramontesjeri Lovell, RN)

 Pain Goal:  1 (Lisa Shellyjeri Davislund, RN)

 Pain Relief Measures:  Comfort Measures (Lisa Viramontesjeri Davislund, RN)

   

=================================================================

Vaginal Exam

=================================================================

   

 Membrane Status:  Intact (Lisa Lovell, RN)

 Vaginal Bleeding:  None (Lisa Lovell, RN)

   

=================================================================

Maternal Assessment

=================================================================

   

 Level of Consciousness:  Fully Conscious (Lisa Lovell RN)

 DTR's/Clonus:  DTRs 1+; No Clonus (Lisa Lovell RN)

 Headache:  Frontal (Annotations: INCREASES WHEN BENDING FORWARD)

   (Lisa Lovell RN)

 Breath Sounds, Left:  Diminished; Crackles (Lisa Lovell RN)

 Breath Sounds, Right:  Diminished; Crackles (Lisa Lovell, RN)

 Nausea/Vomiting:  Denies (Lisa Lovell, RN)

 Patient Position/Activity:  SITTING UP IN LOUNGE CHAIR (Lisa Lovell RN)

 Comfort Measures:  Family Support (Lisa Lovell RN)

 I/O Interventions:  Ice Chips Given; Clear Liquids Given; Up to BR

   (Lisa Lovell, CLIVE)

   

=================================================================

Teaching

=================================================================

   

 Instructional Method:  Verbal; Patient Instructed; Verbalized

   Understanding (Lisa Lovell RN)

 Plan of Care:  Plan of Care Discussed (Lisa Lovell RN)

 Unit Routine:  [place] to Room; [place]; Bed; Visiting Policy; Unit

   Personnel; Handwashing; Fetal Monitoring; Safety/Fall Risk

   Prevention; Diet/Nutrition Services; Bathroom Privileges (Lisa Lovell RN)

 Pain Management:  Pain Scale/Goals; Comfort Measures (Lisa Lovell RN)

 Pregnancy Related:  Common Discomforts of Pregnancy; Maternal Physical

   Changes; Maternal Emotional Changes; Nutrition; Hydration; Activity

   and Rest (Lisa Lovell RN)

   

=================================================================

Communication

=================================================================

   

 Communication:  RN at Bedside; RN Reviewed Strip (Lisa Lovell RN)

 Communication Comments:  UNABLE TO PUT PT ON MONITOR UNTIL SPOUSE

   ARRIVES TO CARE FOR CHILD- PT VERBALIZES UNDERSTANDING. PT DENIES

   ANY OB ISSUES TODAY. (Lisa Lovell RN)

 LaborFlag:  OB Triage (QS system process)

   

=================================================================

Datetime: 04/04/2017 13:43

=================================================================

   

 Pulse:  115 (QS system process)

 SpO2 (%):  99 (QS system process)

   

=================================================================

Datetime: 04/04/2017 13:38

=================================================================

   

 Pulse:  122 (QS system process)

 Pulse:  120 (QS system process)

 SpO2 (%):  100 (QS system process)

 SpO2 (%):  89 (QS system process)

## 2017-04-12 NOTE — L&D GENERAL ADMISSION
=================================================================

General Admit

=================================================================

Datetime Report Generated by N: 2017 18:00

   

   

=================================================================

PREGNANCY INFORMATION

=================================================================

   

Patient Age:  37    (2017 13:01:QS system process)

EDC:  2017 00:00    (2017 13:05:Lisa Lovell RN)

:  6    (2017 13:05:Lisa Lovell RN)

Para:  4    (2017 13:05:Lisa Lovell RN)

Term:  3    (2017 13:05:Lisa Lovell RN)

:  1    (2017 13:05:Lisa Lovell RN)

Spontaneous Abortions:  0    (2017 13:05:Lisa Lovell RN)

Induced Abortions:  1    (2017 13:05:Lisa Lovell RN)

Livin    (2017 13:05:Lisa Lovell RN)

Cesareans:  0    (2017 13:05:Lisa Lovell RN)

VBACs:  0    (2017 13:05:Lisa Lovell RN)

Ectopic:  0    (2017 13:05:Lisa Lovell RN)

Multiple Births:  0    (2017 13:05:Lisa Lovell RN)

Baby, Number in Womb:  1    (2017 13:05:Lisa Lovell RN)

   

=================================================================

PRENATAL CARE

=================================================================

   

Primary Obstetrician:  [facility]    (2017

   13:05:Lisa Lovell RN)

Height (in):  62    (2017 16:28:QS system process)

   

=================================================================

ALLERGIES

=================================================================

   

Medication Allergy:  Yes    (2017 13:05:Lisa Lovell RN)

Medication Allergies:  Penicillins (2017)    (2017 13:32:QS

   system process)

Latex Allergy:  No Latex Allergies    (2017 13:05:Lisa Lovell RN)

Food Allergies:  NONE    (2017 13:05:Lisa Lovell RN)

Environmental Allergies:  CATS, SEASONAL    (2017 13:05:Lisa Lovell RN)

   

=================================================================

COMMUNICATION

=================================================================

   

Primary Language:  English    (2017 13:05:Lisa Lovell RN)

Medical Tx Preferred Language:  English    (2017 13:05:Lisa Lovell RN)

Communication Barrier(s):  None    (2017 13:05:Lisa Lovell RN)

   

=================================================================

DEMOGRAPHICS

=================================================================

   

Address:  [address] [place] [address]   [address]    (2017 13:01:QS system process)

Zipcode:  [address]    (2017 13:01:QS system process)

Home Telephone:  [phone]    (2017 13:01:QS system process)

SSN:      (2017 13:01:QS system process)

Next of Kin Name:  AMANDA EUCEDA    (2017 13:01:QS system

   process)

Next of Kin Phone:  [phone]    (2017 13:01:QS system

   process)

Next of Kin Relationship:  SPO    (2017 13:01:QS system process)

YOB: 1980    (2017 13:01:QS system process)

Marital Status:      (2017 13:01:QS system process)

Sex:  Female    (2017 13:01:QS system process)

Race:      (2017 13:01:QS system process)

Ethnicity:  Non- or     (2017 13:01:QS system

   process)

Sikh:  Protestant    (2017 13:01:QS system process)

   

=================================================================

PRENATAL LABS

=================================================================

   

Blood Type:  A Positive    (2017 13:05:Lisa Lovell RN)

Antibody Screen:  NEGATIVE    (2017 13:05:Lisa Lovell RN)

Hemoglobin:  10.4 L    (2017 14:24:QS system process)

Hematocrit:  30.0 L    (2017 14:24:QS system process)

MCV:  89    (2017 14:24:QS system process)

RPR/VDRL:  Nonreactive    (2017 13:05:Lisa Lovell RN)

HIV Exposure Test:  Negative    (2017 13:05:Lisa Lovell RN)

Hepatitis B:  Negative    (2017 13:05:Lisa Lovell RN)

Rubella:  Immune    (2017 13:05:Lisa Lovell RN)

## 2017-04-12 NOTE — L&D GENERAL ADMISSION
=================================================================

General Admit

=================================================================

Datetime Report Generated by N: 2017 20:20

   

   

=================================================================

PREGNANCY INFORMATION

=================================================================

   

Patient Age:  37    (2017 13:01:QS system process)

EDC:  2017 00:00    (2017 13:05:Lisa Lovell RN)

:  6    (2017 13:05:Lisa Lovell RN)

Para:  4    (2017 13:05:Lisa Lovell RN)

Term:  3    (2017 13:05:Lisa Lovell RN)

:  1    (2017 13:05:Lisa Lovell RN)

Spontaneous Abortions:  0    (2017 13:05:Lisa Lovell RN)

Induced Abortions:  1    (2017 13:05:Lisa Lovell RN)

Livin    (2017 13:05:Lisa Lovell RN)

Cesareans:  0    (2017 13:05:Lisa Lovell RN)

VBACs:  0    (2017 13:05:Lisa Lovell RN)

Ectopic:  0    (2017 13:05:Lisa Lovell RN)

Multiple Births:  0    (2017 13:05:Lisa Lovell RN)

Baby, Number in Womb:  1    (2017 13:05:Lisa Lovell RN)

   

=================================================================

PRENATAL CARE

=================================================================

   

Primary Obstetrician:  [facility]    (2017

   13:05:Lisa Lovell RN)

Height (in):  62    (2017 16:28:QS system process)

   

=================================================================

ALLERGIES

=================================================================

   

Medication Allergy:  Yes    (2017 13:05:Lisa Lovell RN)

Medication Allergies:  Penicillins (2017)    (2017 13:32:QS

   system process)

Latex Allergy:  No Latex Allergies    (2017 13:05:Lisa Lovell RN)

Food Allergies:  NONE    (2017 13:05:Lisa Lovell RN)

Environmental Allergies:  CATS, SEASONAL    (2017 13:05:Lisa Lovell RN)

   

=================================================================

COMMUNICATION

=================================================================

   

Primary Language:  English    (2017 13:05:Lisa Lovell RN)

Medical Tx Preferred Language:  English    (2017 13:05:Lisa Lovell RN)

Communication Barrier(s):  None    (2017 13:05:Lisa Lovell RN)

   

=================================================================

DEMOGRAPHICS

=================================================================

   

Address:  [address] [place] [address]   [address]    (2017 13:01:QS system process)

Zipcode:  [address]    (2017 13:01:QS system process)

Home Telephone:  [phone]    (2017 13:01:QS system process)

SSN:      (2017 13:01:QS system process)

Next of Kin Name:  AMANDA EUCEDA    (2017 13:01:QS system

   process)

Next of Kin Phone:  [phone]    (2017 13:01:QS system

   process)

Next of Kin Relationship:  SPO    (2017 13:01:QS system process)

YOB: 1980    (2017 13:01:QS system process)

Marital Status:      (2017 13:01:QS system process)

Sex:  Female    (2017 13:01:QS system process)

Race:      (2017 13:01:QS system process)

Ethnicity:  Non- or     (2017 13:01:QS system

   process)

Islam:  Islam    (2017 13:01:QS system process)

   

=================================================================

PRENATAL LABS

=================================================================

   

Blood Type:  A Positive    (2017 13:05:Lisa Lovell RN)

Antibody Screen:  NEGATIVE    (2017 13:05:Lisa Lovell RN)

Hemoglobin:  10.4 L    (2017 14:24:QS system process)

Hematocrit:  30.0 L    (2017 14:24:QS system process)

MCV:  89    (2017 14:24:QS system process)

RPR/VDRL:  Nonreactive    (2017 13:05:Lisa Lovell RN)

HIV Exposure Test:  Negative    (2017 13:05:Lisa Lovell RN)

Hepatitis B:  Negative    (2017 13:05:Lisa Lovell RN)

Rubella:  Immune    (2017 13:05:Lisa Lovell RN)

## 2017-04-12 NOTE — L&D CURRENT ADMISSION
=================================================================

Current Admit

=================================================================

Datetime Report Generated by CPN: 04/12/2017 18:00

   

   

=================================================================

ADMISSION INFORMATION

=================================================================

   

Chief Complaint:  Back Pain; Headache; Cough; Dizziness; Shortness of

   Breath; Other    (04/04/2017 13:45:Lisa Lovell RN)

## 2017-04-12 NOTE — L&D GENERAL ADMISSION
=================================================================

General Admit

=================================================================

Datetime Report Generated by N: 2017 20:20

   

   

=================================================================

PREGNANCY INFORMATION

=================================================================

   

Patient Age:  37    (2017 13:01:QS system process)

EDC:  2017 00:00    (2017 13:05:Lisa Lovell RN)

:  6    (2017 13:05:Lisa Lovell RN)

Para:  4    (2017 13:05:Lisa Lovell RN)

Term:  3    (2017 13:05:Lisa Lovell RN)

:  1    (2017 13:05:Lisa Lovell RN)

Spontaneous Abortions:  0    (2017 13:05:Lisa Lovell RN)

Induced Abortions:  1    (2017 13:05:Lisa Lovell RN)

Livin    (2017 13:05:Lisa Lovell RN)

Cesareans:  0    (2017 13:05:Lisa Lovell RN)

VBACs:  0    (2017 13:05:Lisa Lovell RN)

Ectopic:  0    (2017 13:05:Lisa Lovell RN)

Multiple Births:  0    (2017 13:05:Lisa Lovell RN)

Baby, Number in Womb:  1    (2017 13:05:Lisa Lovell RN)

   

=================================================================

PRENATAL CARE

=================================================================

   

Primary Obstetrician:  [facility]    (2017

   13:05:Lisa Lovell RN)

Height (in):  62    (2017 16:28:QS system process)

   

=================================================================

ALLERGIES

=================================================================

   

Medication Allergy:  Yes    (2017 13:05:Lisa Lovell RN)

Medication Allergies:  Penicillins (2017)    (2017 13:32:QS

   system process)

Latex Allergy:  No Latex Allergies    (2017 13:05:Lisa Lovell RN)

Food Allergies:  NONE    (2017 13:05:Lisa Lovell RN)

Environmental Allergies:  CATS, SEASONAL    (2017 13:05:Lisa Lovell RN)

   

=================================================================

COMMUNICATION

=================================================================

   

Primary Language:  English    (2017 13:05:Lisa Lovell RN)

Medical Tx Preferred Language:  English    (2017 13:05:Lisa Lovell RN)

Communication Barrier(s):  None    (2017 13:05:Lisa Lovell RN)

   

=================================================================

DEMOGRAPHICS

=================================================================

   

Address:  [address] [place] [address]   [address]    (2017 13:01:QS system process)

Zipcode:  [address]    (2017 13:01:QS system process)

Home Telephone:  [phone]    (2017 13:01:QS system process)

SSN:      (2017 13:01:QS system process)

Next of Kin Name:  AMANDA EUCEDA    (2017 13:01:QS system

   process)

Next of Kin Phone:  [phone]    (2017 13:01:QS system

   process)

Next of Kin Relationship:  SPO    (2017 13:01:QS system process)

YOB: 1980    (2017 13:01:QS system process)

Marital Status:      (2017 13:01:QS system process)

Sex:  Female    (2017 13:01:QS system process)

Race:      (2017 13:01:QS system process)

Ethnicity:  Non- or     (2017 13:01:QS system

   process)

Confucianist:  Mu-ism    (2017 13:01:QS system process)

   

=================================================================

PRENATAL LABS

=================================================================

   

Blood Type:  A Positive    (2017 13:05:Lisa Lovell RN)

Antibody Screen:  NEGATIVE    (2017 13:05:Lisa Lovell RN)

Hemoglobin:  10.4 L    (2017 14:24:QS system process)

Hematocrit:  30.0 L    (2017 14:24:QS system process)

MCV:  89    (2017 14:24:QS system process)

RPR/VDRL:  Nonreactive    (2017 13:05:Lisa Lovell RN)

HIV Exposure Test:  Negative    (2017 13:05:Lisa Lovell RN)

Hepatitis B:  Negative    (2017 13:05:Lisa Lovell RN)

Rubella:  Immune    (2017 13:05:Lisa Lovell RN)

## 2017-04-12 NOTE — L&D GENERAL ADMISSION
=================================================================

General Admit

=================================================================

Datetime Report Generated by N: 2017 18:00

   

   

=================================================================

PREGNANCY INFORMATION

=================================================================

   

Patient Age:  37    (2017 13:01:QS system process)

EDC:  2017 00:00    (2017 13:05:Lisa Lovell RN)

:  6    (2017 13:05:Lisa Lovell RN)

Para:  4    (2017 13:05:Lisa Lovell RN)

Term:  3    (2017 13:05:Lisa Lovell RN)

:  1    (2017 13:05:Lisa Lovell RN)

Spontaneous Abortions:  0    (2017 13:05:Lisa Lovell RN)

Induced Abortions:  1    (2017 13:05:Lisa Lovell RN)

Livin    (2017 13:05:Lisa Lovell RN)

Cesareans:  0    (2017 13:05:Lisa Lovell RN)

VBACs:  0    (2017 13:05:Lisa Lovell RN)

Ectopic:  0    (2017 13:05:Lisa Lovell RN)

Multiple Births:  0    (2017 13:05:Lisa Lovell RN)

Baby, Number in Womb:  1    (2017 13:05:Lisa Lovell RN)

   

=================================================================

PRENATAL CARE

=================================================================

   

Primary Obstetrician:  [facility]    (2017

   13:05:Lisa Lovell RN)

Height (in):  62    (2017 16:28:QS system process)

   

=================================================================

ALLERGIES

=================================================================

   

Medication Allergy:  Yes    (2017 13:05:Lisa Lovell RN)

Medication Allergies:  Penicillins (2017)    (2017 13:32:QS

   system process)

Latex Allergy:  No Latex Allergies    (2017 13:05:Lisa Lovell RN)

Food Allergies:  NONE    (2017 13:05:Lisa Lovell RN)

Environmental Allergies:  CATS, SEASONAL    (2017 13:05:Lisa Lovell RN)

   

=================================================================

COMMUNICATION

=================================================================

   

Primary Language:  English    (2017 13:05:Lisa Lovell RN)

Medical Tx Preferred Language:  English    (2017 13:05:Lisa Lovell RN)

Communication Barrier(s):  None    (2017 13:05:Lisa Lovell RN)

   

=================================================================

DEMOGRAPHICS

=================================================================

   

Address:  [address] [place] [address]   [address]    (2017 13:01:QS system process)

Zipcode:  [address]    (2017 13:01:QS system process)

Home Telephone:  [phone]    (2017 13:01:QS system process)

SSN:      (2017 13:01:QS system process)

Next of Kin Name:  AMANDA EUCEDA    (2017 13:01:QS system

   process)

Next of Kin Phone:  [phone]    (2017 13:01:QS system

   process)

Next of Kin Relationship:  SPO    (2017 13:01:QS system process)

YOB: 1980    (2017 13:01:QS system process)

Marital Status:      (2017 13:01:QS system process)

Sex:  Female    (2017 13:01:QS system process)

Race:      (2017 13:01:QS system process)

Ethnicity:  Non- or     (2017 13:01:QS system

   process)

Cheondoism:  Restorationist    (2017 13:01:QS system process)

   

=================================================================

PRENATAL LABS

=================================================================

   

Blood Type:  A Positive    (2017 13:05:Lisa Lovell RN)

Antibody Screen:  NEGATIVE    (2017 13:05:Lisa Lovell RN)

Hemoglobin:  10.4 L    (2017 14:24:QS system process)

Hematocrit:  30.0 L    (2017 14:24:QS system process)

MCV:  89    (2017 14:24:QS system process)

RPR/VDRL:  Nonreactive    (2017 13:05:Lisa Lovell RN)

HIV Exposure Test:  Negative    (2017 13:05:Lisa Lovell RN)

Hepatitis B:  Negative    (2017 13:05:Lisa Lovell RN)

Rubella:  Immune    (2017 13:05:Lisa Lovell RN)

## 2017-04-12 NOTE — DELIVERY SUMMARY
=================================================================

[place] A-[facility]

=================================================================

Datetime Report Generated by CPN: 04/12/2017 20:20

   

   

=================================================================

LABOR SUMMARY

=================================================================

   

EDC:  07/27/2017 00:00

No. Babies in Womb:  1

## 2017-04-12 NOTE — L&D CURRENT ADMISSION
=================================================================

Current Admit

=================================================================

Datetime Report Generated by CPN: 04/12/2017 20:20

   

   

=================================================================

ADMISSION INFORMATION

=================================================================

   

Chief Complaint:  Back Pain; Headache; Cough; Dizziness; Shortness of

   Breath; Other    (04/04/2017 13:45:Lisa Lovell RN)

## 2017-05-05 ENCOUNTER — HOSPITAL ENCOUNTER (OUTPATIENT)
Dept: HOSPITAL 62 - LC | Age: 37
Discharge: HOME | End: 2017-05-05
Attending: OBSTETRICS & GYNECOLOGY
Payer: OTHER GOVERNMENT

## 2017-05-05 DIAGNOSIS — Z3A.29: ICD-10-CM

## 2017-05-05 DIAGNOSIS — O47.03: Primary | ICD-10-CM

## 2017-05-05 DIAGNOSIS — O09.523: ICD-10-CM

## 2017-05-05 LAB
APPEARANCE UR: (no result)
BARBITURATES UR QL SCN: NEGATIVE
BILIRUB UR QL STRIP: NEGATIVE
GLUCOSE UR STRIP-MCNC: >=500 MG/DL
KETONES UR STRIP-MCNC: (no result) MG/DL
METHADONE UR QL SCN: NEGATIVE
NITRITE UR QL STRIP: NEGATIVE
PCP UR QL SCN: NEGATIVE
PH UR STRIP: 7 [PH] (ref 5–9)
PROT UR STRIP-MCNC: NEGATIVE MG/DL
SP GR UR STRIP: 1.02
URINE OPIATES LOW: NEGATIVE
UROBILINOGEN UR-MCNC: NEGATIVE MG/DL (ref ?–2)

## 2017-05-05 PROCEDURE — 96372 THER/PROPH/DIAG INJ SC/IM: CPT

## 2017-05-05 PROCEDURE — 81001 URINALYSIS AUTO W/SCOPE: CPT

## 2017-05-05 PROCEDURE — 80307 DRUG TEST PRSMV CHEM ANLYZR: CPT

## 2017-05-05 PROCEDURE — 59899 UNLISTED PX MAT CARE&DLVR: CPT

## 2017-05-05 PROCEDURE — 4A1HXCZ MONITORING OF PRODUCTS OF CONCEPTION, CARDIAC RATE, EXTERNAL APPROACH: ICD-10-PCS | Performed by: OBSTETRICS & GYNECOLOGY

## 2017-05-14 ENCOUNTER — HOSPITAL ENCOUNTER (OUTPATIENT)
Dept: HOSPITAL 62 - LC | Age: 37
Discharge: HOME | End: 2017-05-14
Attending: OBSTETRICS & GYNECOLOGY
Payer: OTHER GOVERNMENT

## 2017-05-14 DIAGNOSIS — O47.03: Primary | ICD-10-CM

## 2017-05-14 DIAGNOSIS — Z3A.29: ICD-10-CM

## 2017-05-14 LAB
APPEARANCE UR: CLEAR
BARBITURATES UR QL SCN: NEGATIVE
BILIRUB UR QL STRIP: NEGATIVE
GLUCOSE UR STRIP-MCNC: NEGATIVE MG/DL
KETONES UR STRIP-MCNC: NEGATIVE MG/DL
METHADONE UR QL SCN: NEGATIVE
NITRITE UR QL STRIP: NEGATIVE
PCP UR QL SCN: NEGATIVE
PH UR STRIP: 7 [PH] (ref 5–9)
PROT UR STRIP-MCNC: NEGATIVE MG/DL
SP GR UR STRIP: 1
URINE OPIATES LOW: NEGATIVE
UROBILINOGEN UR-MCNC: NEGATIVE MG/DL (ref ?–2)

## 2017-05-14 PROCEDURE — 59899 UNLISTED PX MAT CARE&DLVR: CPT

## 2017-05-14 PROCEDURE — 81001 URINALYSIS AUTO W/SCOPE: CPT

## 2017-05-14 PROCEDURE — 76815 OB US LIMITED FETUS(S): CPT

## 2017-05-14 PROCEDURE — 4A1HXCZ MONITORING OF PRODUCTS OF CONCEPTION, CARDIAC RATE, EXTERNAL APPROACH: ICD-10-PCS | Performed by: OBSTETRICS & GYNECOLOGY

## 2017-05-14 PROCEDURE — 80307 DRUG TEST PRSMV CHEM ANLYZR: CPT

## 2017-06-22 ENCOUNTER — HOSPITAL ENCOUNTER (OUTPATIENT)
Dept: HOSPITAL 62 - LC | Age: 37
Discharge: HOME | End: 2017-06-22
Attending: OBSTETRICS & GYNECOLOGY
Payer: OTHER GOVERNMENT

## 2017-06-22 DIAGNOSIS — O14.93: Primary | ICD-10-CM

## 2017-06-22 LAB
ALBUMIN SERPL-MCNC: 3.1 G/DL (ref 3.5–5)
ALP SERPL-CCNC: 156 U/L (ref 38–126)
ALT SERPL-CCNC: 29 U/L (ref 9–52)
ANION GAP SERPL CALC-SCNC: 12 MMOL/L (ref 5–19)
APPEARANCE UR: (no result)
AST SERPL-CCNC: 32 U/L (ref 14–36)
BARBITURATES UR QL SCN: NEGATIVE
BASOPHILS # BLD AUTO: 0 10^3/UL (ref 0–0.2)
BASOPHILS NFR BLD AUTO: 0.2 % (ref 0–2)
BILIRUB DIRECT SERPL-MCNC: 0.3 MG/DL (ref 0–0.4)
BILIRUB SERPL-MCNC: 0.3 MG/DL (ref 0.2–1.3)
BILIRUB UR QL STRIP: NEGATIVE
BUN SERPL-MCNC: 6 MG/DL (ref 7–20)
CALCIUM: 8.8 MG/DL (ref 8.4–10.2)
CHLORIDE SERPL-SCNC: 103 MMOL/L (ref 98–107)
CO2 SERPL-SCNC: 23 MMOL/L (ref 22–30)
CREAT SERPL-MCNC: 0.51 MG/DL (ref 0.52–1.25)
CREAT UR-MCNC: 119.5 MG/DL (ref 16–327)
EOSINOPHIL # BLD AUTO: 0.1 10^3/UL (ref 0–0.6)
EOSINOPHIL NFR BLD AUTO: 1 % (ref 0–6)
ERYTHROCYTE [DISTWIDTH] IN BLOOD BY AUTOMATED COUNT: 16.6 % (ref 11.5–14)
GLUCOSE SERPL-MCNC: 131 MG/DL (ref 75–110)
GLUCOSE UR STRIP-MCNC: NEGATIVE MG/DL
HCT VFR BLD CALC: 27.1 % (ref 36–47)
HGB BLD-MCNC: 8.7 G/DL (ref 12–15.5)
HGB HCT DIFFERENCE: -1
KETONES UR STRIP-MCNC: NEGATIVE MG/DL
LDH1 SERPL-CCNC: 509 U/L (ref 313–618)
LYMPHOCYTES # BLD AUTO: 1.3 10^3/UL (ref 0.5–4.7)
LYMPHOCYTES NFR BLD AUTO: 14 % (ref 13–45)
MCH RBC QN AUTO: 26.3 PG (ref 27–33.4)
MCHC RBC AUTO-ENTMCNC: 32.3 G/DL (ref 32–36)
MCV RBC AUTO: 81 FL (ref 80–97)
METHADONE UR QL SCN: NEGATIVE
MONOCYTES # BLD AUTO: 0.4 10^3/UL (ref 0.1–1.4)
MONOCYTES NFR BLD AUTO: 3.9 % (ref 3–13)
NEUTROPHILS # BLD AUTO: 7.5 10^3/UL (ref 1.7–8.2)
NEUTS SEG NFR BLD AUTO: 80.9 % (ref 42–78)
NITRITE UR QL STRIP: NEGATIVE
PCP UR QL SCN: NEGATIVE
PH UR STRIP: 7 [PH] (ref 5–9)
POTASSIUM SERPL-SCNC: 3.9 MMOL/L (ref 3.6–5)
PROT SERPL-MCNC: 5.9 G/DL (ref 6.3–8.2)
PROT UR STRIP-MCNC: 100 MG/DL
PROT UR STRIP-MCNC: 182.4 MG/DL (ref ?–12)
RBC # BLD AUTO: 3.33 10^6/UL (ref 3.72–5.28)
SODIUM SERPL-SCNC: 137.7 MMOL/L (ref 137–145)
SP GR UR STRIP: 1.01
URATE SERPL-MCNC: 4.7 MG/DL (ref 2.5–7)
URINE OPIATES LOW: NEGATIVE
UROBILINOGEN UR-MCNC: NEGATIVE MG/DL (ref ?–2)
WBC # BLD AUTO: 9.3 10^3/UL (ref 4–10.5)

## 2017-06-22 PROCEDURE — 83615 LACTATE (LD) (LDH) ENZYME: CPT

## 2017-06-22 PROCEDURE — 81001 URINALYSIS AUTO W/SCOPE: CPT

## 2017-06-22 PROCEDURE — 36415 COLL VENOUS BLD VENIPUNCTURE: CPT

## 2017-06-22 PROCEDURE — 82570 ASSAY OF URINE CREATININE: CPT

## 2017-06-22 PROCEDURE — 80053 COMPREHEN METABOLIC PANEL: CPT

## 2017-06-22 PROCEDURE — 80307 DRUG TEST PRSMV CHEM ANLYZR: CPT

## 2017-06-22 PROCEDURE — 4A1HXCZ MONITORING OF PRODUCTS OF CONCEPTION, CARDIAC RATE, EXTERNAL APPROACH: ICD-10-PCS | Performed by: OBSTETRICS & GYNECOLOGY

## 2017-06-22 PROCEDURE — 84550 ASSAY OF BLOOD/URIC ACID: CPT

## 2017-06-22 PROCEDURE — 59025 FETAL NON-STRESS TEST: CPT

## 2017-06-22 PROCEDURE — 85025 COMPLETE CBC W/AUTO DIFF WBC: CPT

## 2017-06-22 PROCEDURE — 84156 ASSAY OF PROTEIN URINE: CPT

## 2017-06-22 NOTE — NON STRESS TEST REPORT
=================================================================

Non Stress Test

=================================================================

Datetime Report Generated by CPN: 06/22/2017 17:51

   

   

=================================================================

DEMOGRAPHIC

=================================================================

   

EGA NST:  35.0

   

=================================================================

INDICATION

=================================================================

   

Indication for Study:  Gestational Hypertension; Ordered by Provider

Indication for Study (NST) Other:  PRE-E W/U

   

=================================================================

MONITORING 

=================================================================

   

Monitor Explained:  Monitor Explained; Test Explained; Patient

   Verbalized Understanding

Time on Monitor:  06/22/2017 16:30

Time off Monitor:  06/22/2017 17:21

NST Duration:  51

   

=================================================================

NST INTERVENTIONS

=================================================================

   

NST Interventions:  PO Hydration; Reposition Patient

BABY A:  [id number]

Fetal Movement :  Present

Contraction Frequency :  IRREG

FHR Baseline :  145

Accelerations :  15X15

Decelerations :  None

Variability :  Moderate 6-25bpm

NST Review:  Meets Criteria for Reactive NST

NST Review and Verified By :  CLIVE Santiago Results:  Reactive

   

=================================================================

NST REPORT

=================================================================

   

Report Trigger:  Send Report

## 2017-06-24 LAB — PROT UR STRIP-MCNC: 63.8 MG/DL (ref ?–12)

## 2017-06-26 ENCOUNTER — HOSPITAL ENCOUNTER (OUTPATIENT)
Dept: HOSPITAL 62 - LC | Age: 37
Discharge: ANOTHER HOSPITAL | End: 2017-06-26
Attending: OBSTETRICS & GYNECOLOGY
Payer: OTHER GOVERNMENT

## 2017-06-26 DIAGNOSIS — Z3A.36: ICD-10-CM

## 2017-06-26 DIAGNOSIS — O14.13: Primary | ICD-10-CM

## 2017-06-26 LAB
ALBUMIN SERPL-MCNC: 3.1 G/DL (ref 3.5–5)
ALP SERPL-CCNC: 162 U/L (ref 38–126)
ALT SERPL-CCNC: 30 U/L (ref 9–52)
ANION GAP SERPL CALC-SCNC: 8 MMOL/L (ref 5–19)
APPEARANCE UR: (no result)
AST SERPL-CCNC: 28 U/L (ref 14–36)
BACTERIA #/AREA URNS HPF: (no result) /HPF
BARBITURATES UR QL SCN: NEGATIVE
BASOPHILS # BLD AUTO: 0 10^3/UL (ref 0–0.2)
BASOPHILS NFR BLD AUTO: 0.5 % (ref 0–2)
BILIRUB DIRECT SERPL-MCNC: 0.2 MG/DL (ref 0–0.4)
BILIRUB SERPL-MCNC: 0.4 MG/DL (ref 0.2–1.3)
BILIRUB UR QL STRIP: NEGATIVE
BUN SERPL-MCNC: 4 MG/DL (ref 7–20)
CALCIUM: 9.2 MG/DL (ref 8.4–10.2)
CHLORIDE SERPL-SCNC: 103 MMOL/L (ref 98–107)
CO2 SERPL-SCNC: 26 MMOL/L (ref 22–30)
CREAT SERPL-MCNC: 0.53 MG/DL (ref 0.52–1.25)
EOSINOPHIL # BLD AUTO: 0.1 10^3/UL (ref 0–0.6)
EOSINOPHIL NFR BLD AUTO: 1.7 % (ref 0–6)
ERYTHROCYTE [DISTWIDTH] IN BLOOD BY AUTOMATED COUNT: 17.1 % (ref 11.5–14)
GLUCOSE SERPL-MCNC: 82 MG/DL (ref 75–110)
GLUCOSE UR STRIP-MCNC: NEGATIVE MG/DL
HCT VFR BLD CALC: 29.1 % (ref 36–47)
HGB BLD-MCNC: 9.3 G/DL (ref 12–15.5)
HGB HCT DIFFERENCE: -1.2
KETONES UR STRIP-MCNC: NEGATIVE MG/DL
LDH1 SERPL-CCNC: 528 U/L (ref 313–618)
LYMPHOCYTES # BLD AUTO: 1.3 10^3/UL (ref 0.5–4.7)
LYMPHOCYTES NFR BLD AUTO: 14.6 % (ref 13–45)
MCH RBC QN AUTO: 25.6 PG (ref 27–33.4)
MCHC RBC AUTO-ENTMCNC: 31.9 G/DL (ref 32–36)
MCV RBC AUTO: 80 FL (ref 80–97)
METHADONE UR QL SCN: NEGATIVE
MONOCYTES # BLD AUTO: 0.5 10^3/UL (ref 0.1–1.4)
MONOCYTES NFR BLD AUTO: 6.2 % (ref 3–13)
NEUTROPHILS # BLD AUTO: 6.7 10^3/UL (ref 1.7–8.2)
NEUTS SEG NFR BLD AUTO: 77 % (ref 42–78)
NITRITE UR QL STRIP: NEGATIVE
PCP UR QL SCN: NEGATIVE
PH UR STRIP: 7 [PH] (ref 5–9)
POTASSIUM SERPL-SCNC: 4.5 MMOL/L (ref 3.6–5)
PROT SERPL-MCNC: 6 G/DL (ref 6.3–8.2)
PROT UR STRIP-MCNC: >=500 MG/DL
RBC # BLD AUTO: 3.62 10^6/UL (ref 3.72–5.28)
RBC #/AREA URNS HPF: (no result) /HPF
SODIUM SERPL-SCNC: 136.6 MMOL/L (ref 137–145)
SP GR UR STRIP: 1.01
URATE SERPL-MCNC: 4.7 MG/DL (ref 2.5–7)
URINE OPIATES LOW: NEGATIVE
UROBILINOGEN UR-MCNC: NEGATIVE MG/DL (ref ?–2)
WBC # BLD AUTO: 8.7 10^3/UL (ref 4–10.5)
WBC #/AREA URNS HPF: (no result) /HPF

## 2017-06-26 PROCEDURE — 85025 COMPLETE CBC W/AUTO DIFF WBC: CPT

## 2017-06-26 PROCEDURE — 80053 COMPREHEN METABOLIC PANEL: CPT

## 2017-06-26 PROCEDURE — 59025 FETAL NON-STRESS TEST: CPT

## 2017-06-26 PROCEDURE — 83615 LACTATE (LD) (LDH) ENZYME: CPT

## 2017-06-26 PROCEDURE — 80307 DRUG TEST PRSMV CHEM ANLYZR: CPT

## 2017-06-26 PROCEDURE — 81001 URINALYSIS AUTO W/SCOPE: CPT

## 2017-06-26 PROCEDURE — 36415 COLL VENOUS BLD VENIPUNCTURE: CPT

## 2017-06-26 PROCEDURE — S0183 PROCHLORPERAZINE 5 MG: HCPCS

## 2017-06-26 PROCEDURE — 84550 ASSAY OF BLOOD/URIC ACID: CPT

## 2017-08-09 LAB
APPEARANCE UR: CLEAR
BILIRUB UR QL STRIP: NEGATIVE
ERYTHROCYTE [DISTWIDTH] IN BLOOD BY AUTOMATED COUNT: 21.6 % (ref 11.5–14)
GLUCOSE UR STRIP-MCNC: NEGATIVE MG/DL
HCT VFR BLD CALC: 38.3 % (ref 36–47)
HGB BLD-MCNC: 12.5 G/DL (ref 12–15.5)
HGB HCT DIFFERENCE: -0.8
KETONES UR STRIP-MCNC: NEGATIVE MG/DL
MCH RBC QN AUTO: 27 PG (ref 27–33.4)
MCHC RBC AUTO-ENTMCNC: 32.8 G/DL (ref 32–36)
MCV RBC AUTO: 82 FL (ref 80–97)
NITRITE UR QL STRIP: NEGATIVE
PH UR STRIP: 7 [PH] (ref 5–9)
PROT UR STRIP-MCNC: NEGATIVE MG/DL
RBC # BLD AUTO: 4.65 10^6/UL (ref 3.72–5.28)
SP GR UR STRIP: 1
UROBILINOGEN UR-MCNC: NEGATIVE MG/DL (ref ?–2)
WBC # BLD AUTO: 5.8 10^3/UL (ref 4–10.5)

## 2017-08-10 ENCOUNTER — HOSPITAL ENCOUNTER (OUTPATIENT)
Dept: HOSPITAL 62 - OROUT | Age: 37
Discharge: HOME | End: 2017-08-10
Attending: OBSTETRICS & GYNECOLOGY
Payer: MEDICAID

## 2017-08-10 VITALS — DIASTOLIC BLOOD PRESSURE: 90 MMHG | SYSTOLIC BLOOD PRESSURE: 131 MMHG

## 2017-08-10 DIAGNOSIS — Z79.899: ICD-10-CM

## 2017-08-10 DIAGNOSIS — Z87.891: ICD-10-CM

## 2017-08-10 DIAGNOSIS — G40.909: ICD-10-CM

## 2017-08-10 DIAGNOSIS — Z88.0: ICD-10-CM

## 2017-08-10 DIAGNOSIS — F90.9: ICD-10-CM

## 2017-08-10 DIAGNOSIS — Z30.2: Primary | ICD-10-CM

## 2017-08-10 PROCEDURE — 58671 LAPAROSCOPY TUBAL BLOCK: CPT

## 2017-08-10 PROCEDURE — 85027 COMPLETE CBC AUTOMATED: CPT

## 2017-08-10 PROCEDURE — 0UL74CZ OCCLUSION OF BILATERAL FALLOPIAN TUBES WITH EXTRALUMINAL DEVICE, PERCUTANEOUS ENDOSCOPIC APPROACH: ICD-10-PCS | Performed by: OBSTETRICS & GYNECOLOGY

## 2017-08-10 PROCEDURE — 81025 URINE PREGNANCY TEST: CPT

## 2017-08-10 PROCEDURE — 81005 URINALYSIS: CPT

## 2017-08-10 PROCEDURE — 36415 COLL VENOUS BLD VENIPUNCTURE: CPT

## 2017-08-10 PROCEDURE — S0028 INJECTION, FAMOTIDINE, 20 MG: HCPCS

## 2017-08-10 NOTE — OPERATIVE REPORT E
Operative Report



NAME: KRIS EUCEDA

MRN:  [id number]          : 1980 AGE:  37Y

DATE OF SURGERY: 08/10/2017              ROOM:



PREOPERATIVE DIAGNOSIS:

Undesired fertility with desire for permanent sterilization with tubal

ligation.



POSTOPERATIVE DIAGNOSIS:

Undesired fertility with desire for permanent sterilization with tubal

ligation.



OPERATION PERFORMED:

Laparoscopic bilateral tubal ligation with Filshie clips.



SURGEON:

RAMILA FLOOD M.D.



ANESTHESIA:

General endotracheal.



ESTIMATED BLOOD LOSS:

5 mL.



SPECIMEN TO PATHOLOGY:

None.



FINDINGS:

There was a boggy uterus present consistent with less than 2 months

postpartum.  There were normal tubes bilaterally and a normal left ovary. 

There was an ovarian remnant on the right.  There was also a grade 3 to 4

rectocele noted on vaginal exam.



DESCRIPTION OF PROCEDURE:

After discussing risks, benefits, and alternatives of the procedure and

obtaining informed consent, the patient was taken to the operating room

where general anesthesia was achieved.  She was positioned in a dorsal

lithotomy position, prepped and draped in the usual standard fashion.  A

speculum was placed in the vagina and a Hulka uterine manipulator placed. 

The speculum was removed and attention was turned to the patient's

abdomen.  Each trocar site was premedicated with 0.25% Marcaine plain.  A

5-mm incision was made in the infraumbilical fold.  The abdominal wall was

elevated and tje peritoneal cavity entered under direct visualization with the 
Optiview trocar. 

The abdomen was insufflated, the patient was placed in Trendelenburg, and

the left lower quadrant 8-mm trocar placed under direct visualization. 

The tubes were identified out to their fimbriated ends.  A Filshie clip was

placed across the isthmic portion of each fallopian tube.  The left lower

quadrant trocar was removed under direct visualization and it was assured

that there was no bleeding at that site.  The abdomen was desufflated and

the umbilical port removed.  The skin incisions were closed with 3-0

Monocryl followed by Dermabond.  The Hulka uterine manipulator was removed

from the vagina.  Some bleeding was noted; therefore, the speculum was

placed and the tenaculum site treated with Monsel Solution.  Excellent

hemostasis was then observed.  All instruments were removed from the

vagina.  The patient was taken out of dorsal lithotomy and to recovery in

stable condition.  All sponge, needle, lap and instrument counts were

correct x2.





DICTATING PHYSICIAN:  RAMILA FLOOD M.D.





[id number]                  DT: 08/10/2017    [id number]

PHY#: [id number]            DD: 08/10/2017    0941

ID:   [id number]           JOB#: [id number]       ACCT: [id number]



cc:RAMILA FLOOD M.D.

>







[facility]

## 2019-03-27 ENCOUNTER — HOSPITAL ENCOUNTER (EMERGENCY)
Dept: HOSPITAL 62 - ER | Age: 39
Discharge: HOME | End: 2019-03-27
Payer: OTHER GOVERNMENT

## 2019-03-27 VITALS — SYSTOLIC BLOOD PRESSURE: 118 MMHG | DIASTOLIC BLOOD PRESSURE: 82 MMHG

## 2019-03-27 DIAGNOSIS — Z87.891: ICD-10-CM

## 2019-03-27 DIAGNOSIS — Z90.721: ICD-10-CM

## 2019-03-27 DIAGNOSIS — Z98.51: ICD-10-CM

## 2019-03-27 DIAGNOSIS — N94.6: Primary | ICD-10-CM

## 2019-03-27 DIAGNOSIS — N83.202: ICD-10-CM

## 2019-03-27 DIAGNOSIS — R93.89: ICD-10-CM

## 2019-03-27 DIAGNOSIS — Z88.0: ICD-10-CM

## 2019-03-27 LAB
ADD MANUAL DIFF: NO
ANION GAP SERPL CALC-SCNC: 8 MMOL/L (ref 5–19)
APPEARANCE UR: CLEAR
APTT PPP: YELLOW S
BASOPHILS # BLD AUTO: 0.1 10^3/UL (ref 0–0.2)
BASOPHILS NFR BLD AUTO: 0.7 % (ref 0–2)
BILIRUB UR QL STRIP: NEGATIVE
BUN SERPL-MCNC: 7 MG/DL (ref 7–20)
CALCIUM: 9.6 MG/DL (ref 8.4–10.2)
CHLORIDE SERPL-SCNC: 101 MMOL/L (ref 98–107)
CO2 SERPL-SCNC: 29 MMOL/L (ref 22–30)
EOSINOPHIL # BLD AUTO: 0.4 10^3/UL (ref 0–0.6)
EOSINOPHIL NFR BLD AUTO: 4.4 % (ref 0–6)
ERYTHROCYTE [DISTWIDTH] IN BLOOD BY AUTOMATED COUNT: 15.1 % (ref 11.5–14)
GLUCOSE SERPL-MCNC: 98 MG/DL (ref 75–110)
GLUCOSE UR STRIP-MCNC: NEGATIVE MG/DL
HCT VFR BLD CALC: 34.3 % (ref 36–47)
HGB BLD-MCNC: 11.8 G/DL (ref 12–15.5)
KETONES UR STRIP-MCNC: NEGATIVE MG/DL
LYMPHOCYTES # BLD AUTO: 2.2 10^3/UL (ref 0.5–4.7)
LYMPHOCYTES NFR BLD AUTO: 24.6 % (ref 13–45)
MCH RBC QN AUTO: 30.4 PG (ref 27–33.4)
MCHC RBC AUTO-ENTMCNC: 34.4 G/DL (ref 32–36)
MCV RBC AUTO: 89 FL (ref 80–97)
MONOCYTES # BLD AUTO: 0.4 10^3/UL (ref 0.1–1.4)
MONOCYTES NFR BLD AUTO: 4.4 % (ref 3–13)
NEUTROPHILS # BLD AUTO: 5.9 10^3/UL (ref 1.7–8.2)
NEUTS SEG NFR BLD AUTO: 65.9 % (ref 42–78)
NITRITE UR QL STRIP: NEGATIVE
PH UR STRIP: 6 [PH] (ref 5–9)
PLATELET # BLD: 290 10^3/UL (ref 150–450)
POTASSIUM SERPL-SCNC: 4.1 MMOL/L (ref 3.6–5)
PROT UR STRIP-MCNC: NEGATIVE MG/DL
RBC # BLD AUTO: 3.87 10^6/UL (ref 3.72–5.28)
SODIUM SERPL-SCNC: 137.9 MMOL/L (ref 137–145)
SP GR UR STRIP: 1.01
TOTAL CELLS COUNTED % (AUTO): 100 %
UROBILINOGEN UR-MCNC: NEGATIVE MG/DL (ref ?–2)
WBC # BLD AUTO: 9 10^3/UL (ref 4–10.5)

## 2019-03-27 PROCEDURE — 87086 URINE CULTURE/COLONY COUNT: CPT

## 2019-03-27 PROCEDURE — 80048 BASIC METABOLIC PNL TOTAL CA: CPT

## 2019-03-27 PROCEDURE — 86901 BLOOD TYPING SEROLOGIC RH(D): CPT

## 2019-03-27 PROCEDURE — 36415 COLL VENOUS BLD VENIPUNCTURE: CPT

## 2019-03-27 PROCEDURE — 81001 URINALYSIS AUTO W/SCOPE: CPT

## 2019-03-27 PROCEDURE — 93976 VASCULAR STUDY: CPT

## 2019-03-27 PROCEDURE — 85025 COMPLETE CBC W/AUTO DIFF WBC: CPT

## 2019-03-27 PROCEDURE — 81025 URINE PREGNANCY TEST: CPT

## 2019-03-27 PROCEDURE — 87088 URINE BACTERIA CULTURE: CPT

## 2019-03-27 PROCEDURE — 96374 THER/PROPH/DIAG INJ IV PUSH: CPT

## 2019-03-27 PROCEDURE — 86850 RBC ANTIBODY SCREEN: CPT

## 2019-03-27 PROCEDURE — 76830 TRANSVAGINAL US NON-OB: CPT

## 2019-03-27 PROCEDURE — 99284 EMERGENCY DEPT VISIT MOD MDM: CPT

## 2019-03-27 PROCEDURE — 86900 BLOOD TYPING SEROLOGIC ABO: CPT

## 2019-03-27 NOTE — ER DOCUMENT REPORT
ED General





- General


Chief Complaint: Vaginal Bleeding


Stated Complaint: VAGINAL BLEEDING, BACK PAIN, DIZZY


Time Seen by Provider: 03/27/19 16:33


Primary Care Provider: 


RAMILA FLOOD MD [ACTIVE STAFF] - Follow up as needed


TRAVEL OUTSIDE OF THE [place] IN LAST 30 DAYS: No





- HPI


Notes: 





Patient is a 39-year-old female with a history of right oophorectomy status post

possible fibroid(?) and mental health disorder who presents emergency department

complaining of daily vaginal bleeding for the last month.  Patient states that 

she does have some bloating, but no other abdominal pain.  She is eating and 

drinking without difficulty.  She is urinating normally and having normal soft 

bowel movements with the last one today.  Patient has no other concern of STD or

STI does not want any testing performed.  [facility] which was [facility].  Patient 

states that she has been unable to [facility] in the last couple 

weeks so she came here.  Patient states that on occasion she will have some 

lightheadedness, but has been feeling well otherwise.  Patient otherwise has had

a tubal ligation performed. Patient did note some mid back pain which she has 

had before, but states that she is just here for with her vaginal bleeding at 

this time. Denies any headache, fever, head injury, neck pain, changes in 

vision/speech/mentation/hearing, URI, sore throat, chest pain, palpitations, 

syncope, cough, shortness of breath, wheeze, dyspnea, abdominal pain, 

nausea/vomiting/diarrhea, urinary retention, dysuria, hematuria, loss of control

of bowel or bladder, numbness/tingling, saddle anesthesia, muscle 

paralysis/weakness, or rash.








- Related Data


Allergies/Adverse Reactions: 


                                        





Penicillins Allergy (Verified 03/27/19 16:39)


   "My throat closes"











Past Medical History





- Social History


Smoking Status: Former Smoker


Chew tobacco use (# tins/day): No


Frequency of alcohol use: None


Drug Abuse: None


Family History: None


Patient has suicidal ideation: No


Patient has homicidal ideation: No





- Past Medical History


Cardiac Medical History: Reports: Hx Hypertension - Preeclampsia 


   Denies: Hx Coronary Artery Disease, Hx Heart Attack


Pulmonary Medical History: Reports: Hx Bronchitis, Hx Pneumonia


   Denies: Hx Asthma, Hx COPD


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  

Denies: Hx Cerebrovascular Accident


Renal/ Medical History: Denies: Hx Peritoneal Dialysis


Musculoskeletal Medical History: Reports Hx Arthritis - Back, generalized in 

joints 


Psychiatric Medical History: Reports: Hx Depression


Past Surgical History: Reports: Hx Gynecologic Surgery - right ovary tumor 

removed, Hx Tonsillectomy, Hx Tubal Ligation





- Immunizations


Hx Diphtheria, Pertussis, Tetanus Vaccination: Yes





Review of Systems





- Review of Systems


-: Yes All other systems reviewed and negative





Physical Exam





- Vital signs


Vitals: 


                                        











Temp Pulse Resp BP Pulse Ox


 


 97.4 F   78   18   136/93 H  100 


 


 03/27/19 15:42  03/27/19 15:42  03/27/19 15:42  03/27/19 15:42  03/27/19 15:42














- Notes


Notes: 





PHYSICAL EXAMINATION:





GENERAL: Well-appearing, well-nourished and in no acute distress.





HEAD: Atraumatic, normocephalic.





EYES: Pupils equal round and reactive to light, extraocular movements intact, 

sclera anicteric, conjunctiva are normal.





ENT: Nares patent and without discharge.  oropharynx clear without exudates.  No

tonsilar hypertrophy or erythema.  Moist mucous membranes. 





NECK: Normal range of motion, supple without lymphadenopathy





LUNGS: Breath sounds clear to auscultation bilaterally and equal.  No wheezes 

rales or rhonchi.





HEART: Regular rate and rhythm without murmurs, rubs, gallops.





ABDOMEN: Soft, nontender, nondistended abdomen.  No guarding, no rebound. Normal

bowel sounds present.  No CVA tenderness bilaterally.





: declined by pt.





Musculoskeletal: FROM to passive/active. Strength 5+/5. 





Back:  FROM.  Strength 5+/5.  SLR neg.  No foot drop.  + mild L-paraspinal mm 

tenderness.  No bony or midline tenderness.





Extremities:  No cyanosis, clubbing, or edema b/l.  Peripheral pulses 2+.  

Capillary refill less than 3 seconds.





NEUROLOGICAL: Normal speech, normal gait.  





PSYCH: Normal mood, normal affect.





SKIN: Warm, Dry, normal turgor, no rashes or lesions noted.





Course





- Re-evaluation


Re-evalutation: 





03/27/19 19:57


Patient is an afebrile, well-hydrated, 26-year-old female who presents to the ED

with dysmenorrhea and ovarian cysts with thickened endometrium unspecified.  

Vitals are acceptable without any significant tachycardia, tachypnea, or 

hypoxia.  PE is otherwise unremarkable.  CBC, BMP, UA, HCG 

unremarkable/acceptable.  Patient is nontoxic-appearing is tolerating p.o. 

without any difficulties. See TVUS.  No other labs or imaging warranted at this 

time based on H&P.  Low suspicion/risk for acute appendicitis, bowel 

obstruction, acute cholecystitis, acute cholangitis, perforated diverticulitis, 

incarcerated hernia, pancreatitis, perforated ulcer, peritonitis, sepsis, pelvic

inflammatory disease, ectopic pregnancy, tubo-ovarian abscess, ovarian torsion, 

or other systemic emergent condition at this time.  Patient is aware that her 

condition can change from initial presentation and she needs to monitor symptoms

closely and seek medical attention if any acute changes.  Conservative measures 

otherwise for symptoms.  Recheck with your PCM/OBGYN in 3-5 days.  Return to the

ED with any worsening/concerning symptoms otherwise as reviewed in discharge.  

Patient is in agreement.








- Vital Signs


Vital signs: 


                                        











Temp Pulse Resp BP Pulse Ox


 


 97.4 F   78   18   136/93 H  100 


 


 03/27/19 15:42  03/27/19 15:42  03/27/19 15:42  03/27/19 15:42  03/27/19 15:42














- Laboratory


Result Diagrams: 


                                 03/27/19 18:29





                                 03/27/19 18:29


Laboratory results interpreted by me: 


                                        











  03/27/19 03/27/19





  18:05 18:29


 


Hgb   11.8 L


 


Hct   34.3 L


 


RDW   15.1 H


 


Urine Blood  LARGE H 














Discharge





- Discharge


Clinical Impression: 


 Dysmenorrhea, Endometrial thickening on ultrasound





Ovarian cyst


Qualifiers:


 Laterality: left Qualified Code(s): N83.202 - Unspecified ovarian cyst, left 

side





Condition: Stable


Disposition: HOME, SELF-CARE


Instructions:  Dysmenorrhea ([facility])


Additional Instructions: 


Maintain fluid intake


Proper hygienic technique


Keep the skin clean


Safe sexual practices with condoms everytime


Tylenol/ibuprofen as needed


F/u with your PCM/OBGYN in 3-5 days for a recheck


Return to the ED with any development of HA/fever, trouble with vision, eye 

redness, worsening pain, urethral discharge, urinary retention, blood in the 

urine, flank pain, abdominal pain, n/v, Chest Pain, shortness of breath, joint 

pains, trouble breathing, or any other worsening/concerning symptoms as needed 

otherwise.


Referrals: 


RAMILA FLOOD MD [ACTIVE STAFF] - Follow up as needed


[facility] [Provider Group] - Follow up in 3-5 days

## 2019-03-27 NOTE — RADIOLOGY REPORT (SQ)
EXAM DESCRIPTION:  U/S NON OB PEL TV W/DOPPLER



COMPLETED DATE/TIME:  3/27/2019 7:36 pm



REASON FOR STUDY:  abnormal vaginal bleeding



COMPARISON:  None.



TECHNIQUE:  Dynamic and static grayscale images acquired of the pelvis via transvaginal approach and 
recorded on PACS. Additional selected color Doppler and spectral images recorded.



LIMITATIONS:  None.



FINDINGS:  UTERUS: Contour normal.  No mass.

ENDOMETRIAL STRIPE: There is generalized heterogeneous thickening.

CERVIX: No nabothian cysts.

RIGHT OVARY AND DOPPLER: Ovary not visualized.

LEFT OVARY AND DOPPLER: Multiple cysts on the left ovary, largest measures 4.2 cm in greatest dimensi
on. Normal arterial vascular flow without evidence for torsion.

FREE FLUID: None noted.

OTHER: No other significant finding.

MEASUREMENTS:

UTERUS: 7.4 x 6.0 x 3.5 cm

ENDOMETRIAL STRIPE: 2.4 cm

RIGHT OVARY: Not visualized.

LEFT OVARY: 7.1 x 3.8 x 3.4 cm



IMPRESSION:  Heterogeneous thickening of the endometrium measuring 2.4 cm.  Multiple cysts on the lef
t ovary, largest measures 4.2 cm in greatest dimension.  No evidence for torsion.  No free fluid.  No
nvisualized right ovary, reportedly surgically absent.

Recommend short interval follow-up ultrasound in 4 to 6 weeks to further assess.



TECHNICAL DOCUMENTATION:  JOB ID:  [id number]

[id number]

 2011 [employer]- All Rights Reserved                          Rev-5/18



Reading location - IP/workstation name: [email or web address]

## 2019-03-27 NOTE — ER DOCUMENT REPORT
Doctor's Note


Notes: 





I personally and independently obtained patient history and examined the patient

in conjunction with the APC and agree with the assessment, treatment plan and 

disposition of the patient as recorded by the APC, and have reviewed the APC's 

note.





HISTORY OF PRESENT ILLNESS:


Patient is a 49-year-old female that presents to the emergency department for 

chief complaint of abnormal menstrual bleeding.  She reports having daily 

bleeding for over a month now, [facility] but [facility] not have an 

appointment for some time.  She is been explained some lightheadedness as well 

and dizziness, denies having any syncopal episodes.  She states she has had 

ovarian tumor in the past, but is unsure she is had a uterine fibroid before.  





ROS:


Other than noted above, the 12 point review of systems was reviewed with the 

patient and were negative, all pertinent findings are included in the HPI.





PHYSICAL EXAMINATION:





Vital signs reviewed. 





GENERAL: Well-appearing, well-nourished and in no acute distress.





HEAD: Atraumatic, normocephalic.





EYES: Pupils equal round extraocular movements intact,  conjunctiva are normal.





ENT: Nares patent





NECK: Normal range of motion





CV: Heart regular rate and rhythm





LUNGS: No respiratory distress





Musculoskeletal: Normal range of motion





NEUROLOGICAL:  Normal speech





PSYCH: Normal mood, normal affect.





MEDICAL DECISION MAKING:





Patient seen and examined, vital signs reviewed, patient was worked up for her 

menstrual bleeding, to evaluate for possible significant anemia, her CBC was 

unremarkable, only demonstrated a mild anemia, BMP negative, ultrasound 

demonstrated thickened endometrial stripe, patient will need follow-up with 

OB/GYN, as she is having continued abnormal menstrual bleeding, and will need 

further assessment from gynecology.








Please review detail APC documentation. 





*Note is created using voice recognition software and may contain spelling, 

syntax or grammatical errors.  








Laboratory











  03/27/19 03/27/19 03/27/19





  18:05 18:29 18:29


 


WBC   9.0 


 


RBC   3.87 


 


Hgb   11.8 L 


 


Hct   34.3 L 


 


MCV   89 


 


MCH   30.4 


 


MCHC   34.4 


 


RDW   15.1 H 


 


Plt Count   290 


 


Seg Neutrophils %   65.9 


 


Lymphocytes %   24.6 


 


Monocytes %   4.4 


 


Eosinophils %   4.4 


 


Basophils %   0.7 


 


Absolute Neutrophils   5.9 


 


Absolute Lymphocytes   2.2 


 


Absolute Monocytes   0.4 


 


Absolute Eosinophils   0.4 


 


Absolute Basophils   0.1 


 


Sodium    137.9


 


Potassium    4.1


 


Chloride    101


 


Carbon Dioxide    29


 


Anion Gap    8


 


BUN    7


 


Creatinine    0.58


 


Est GFR ( Amer)    > 60


 


Est GFR (Non-Af Amer)    > 60


 


Glucose    98


 


Calcium    9.6


 


Urine Color  YELLOW  


 


Urine Appearance  CLEAR  


 


Urine pH  6.0  


 


Ur Specific Gravity  1.008  


 


Urine Protein  NEGATIVE  


 


Urine Glucose (UA)  NEGATIVE  


 


Urine Ketones  NEGATIVE  


 


Urine Blood  LARGE H  


 


Urine Nitrite  NEGATIVE  


 


Urine Bilirubin  NEGATIVE  


 


Urine Urobilinogen  NEGATIVE  


 


Ur Leukocyte Esterase  NEGATIVE  


 


Urine RBC (Auto)  >182  


 


Squamous Epi Cells Auto  1  


 


Urine Mucus (Auto)  RARE  


 


Urine Ascorbic Acid  NEGATIVE  


 


Urine HCG, Qual  NEGATIVE  


 


Blood Type   


 


Antibody Screen   














  03/27/19





  18:29


 


WBC 


 


RBC 


 


Hgb 


 


Hct 


 


MCV 


 


MCH 


 


MCHC 


 


RDW 


 


Plt Count 


 


Seg Neutrophils % 


 


Lymphocytes % 


 


Monocytes % 


 


Eosinophils % 


 


Basophils % 


 


Absolute Neutrophils 


 


Absolute Lymphocytes 


 


Absolute Monocytes 


 


Absolute Eosinophils 


 


Absolute Basophils 


 


Sodium 


 


Potassium 


 


Chloride 


 


Carbon Dioxide 


 


Anion Gap 


 


BUN 


 


Creatinine 


 


Est GFR ( Amer) 


 


Est GFR (Non-Af Amer) 


 


Glucose 


 


Calcium 


 


Urine Color 


 


Urine Appearance 


 


Urine pH 


 


Ur Specific Gravity 


 


Urine Protein 


 


Urine Glucose (UA) 


 


Urine Ketones 


 


Urine Blood 


 


Urine Nitrite 


 


Urine Bilirubin 


 


Urine Urobilinogen 


 


Ur Leukocyte Esterase 


 


Urine RBC (Auto) 


 


Squamous Epi Cells Auto 


 


Urine Mucus (Auto) 


 


Urine Ascorbic Acid 


 


Urine HCG, Qual 


 


Blood Type  A POSITIVE


 


Antibody Screen  NEGATIVE











                                        





Transvaginal US  03/27/19 16:55


IMPRESSION:  Heterogeneous thickening of the endometrium measuring 2.4 cm.  

Multiple cysts on the left ovary, largest measures 4.2 cm in greatest dimension.

 No evidence for torsion.  No free fluid.  Nonvisualized right ovary, reportedly

surgically absent.


Recommend short interval follow-up ultrasound in 4 to 6 weeks to further assess.

## 2019-03-27 NOTE — ER DOCUMENT REPORT
ED Medical Screen (RME)





- General


Chief Complaint: Vaginal Bleeding


Stated Complaint: VAGINAL BLEEDING, BACK PAIN, DIZZY


Time Seen by Provider: 03/27/19 16:33


Primary Care Provider: 


RAMILA FLOOD MD [Primary Care Provider] - Follow up as needed


Notes: 





Patient is a 49-year-old female that presents to the emergency department for 

chief complaint of abnormal menstrual bleeding.  She reports having daily 

bleeding for over a month now, [facility] but [facility] not have an 

appointment for some time.  She is been explained some lightheadedness as well 

and dizziness, denies having any syncopal episodes





ROS:


Other than noted above, the 12 point review of systems was reviewed with the 

patient and were negative, all pertinent findings are included in the HPI.





PHYSICAL EXAMINATION:





Vital signs reviewed. 





GENERAL: Well-appearing, well-nourished and in no acute distress.





HEAD: Atraumatic, normocephalic.





EYES: Pupils equal round extraocular movements intact,  conjunctiva are normal.





ENT: Nares patent





NECK: Normal range of motion





CV: Heart regular rate and rhythm





LUNGS: No respiratory distress





Musculoskeletal: Normal range of motion





NEUROLOGICAL:  Normal speech





PSYCH: Normal mood, normal affect.





MDM:


Patient seen and examined for rapid initial assessment. Vital signs reviewed.  A

comprehensive ED assessment and evaluation of the patient, analysis of test 

results and completion of the medical decision making process will be conducted 

by additional ED providers.





*Note is created using voice recognition software and may contain spelling, 

syntax or grammatical errors.  











TRAVEL OUTSIDE OF THE [place] IN LAST 30 DAYS: No





- Related Data


Allergies/Adverse Reactions: 


                                        





Penicillins Allergy (Verified 03/27/19 16:39)


   "My throat closes"











Past Medical History





- Social History


Chew tobacco use (# tins/day): No


Frequency of alcohol use: None


Drug Abuse: None





- Past Medical History


Cardiac Medical History: Reports: Hx Hypertension - Preeclampsia 


   Denies: Hx Coronary Artery Disease, Hx Heart Attack


Pulmonary Medical History: Reports: Hx Bronchitis, Hx Pneumonia


   Denies: Hx Asthma, Hx COPD


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  

Denies: Hx Cerebrovascular Accident


Renal/ Medical History: Denies: Hx Peritoneal Dialysis


Musculoskeltal Medical History: Reports Hx Arthritis - Back, generalized in 

joints 


Psychiatric Medical History: Reports: Hx Depression


Past Surgical History: Reports: Hx Gynecologic Surgery - right ovary tumor 

removed, Hx Tonsillectomy, Hx Tubal Ligation





- Immunizations


Hx Diphtheria, Pertussis, Tetanus Vaccination: Yes





Physical Exam





- Vital signs


Vitals: 


                                        











Temp Pulse Resp BP Pulse Ox


 


 97.4 F   78   18   136/93 H  100 


 


 03/27/19 15:42  03/27/19 15:42  03/27/19 15:42  03/27/19 15:42  03/27/19 15:42














Course





- Vital Signs


Vital signs: 


                                        











Temp Pulse Resp BP Pulse Ox


 


 97.4 F   78   18   136/93 H  100 


 


 03/27/19 15:42  03/27/19 15:42  03/27/19 15:42  03/27/19 15:42  03/27/19 15:42














Doctor's Discharge





- Discharge


Referrals: 


RAMILA FLOOD MD [Primary Care Provider] - Follow up as needed

## 2020-01-22 ENCOUNTER — HOSPITAL ENCOUNTER (OUTPATIENT)
Dept: HOSPITAL 62 - LAB | Age: 40
End: 2020-01-22
Attending: OBSTETRICS & GYNECOLOGY
Payer: OTHER GOVERNMENT

## 2020-01-22 DIAGNOSIS — R50.9: Primary | ICD-10-CM

## 2020-01-22 LAB
ADD MANUAL DIFF: NO
ALBUMIN SERPL-MCNC: 5.3 G/DL (ref 3.5–5)
ALP SERPL-CCNC: 58 U/L (ref 38–126)
ANION GAP SERPL CALC-SCNC: 12 MMOL/L (ref 5–19)
AST SERPL-CCNC: 33 U/L (ref 14–36)
BASOPHILS # BLD AUTO: 0.1 10^3/UL (ref 0–0.2)
BASOPHILS NFR BLD AUTO: 1.1 % (ref 0–2)
BILIRUB DIRECT SERPL-MCNC: 0.3 MG/DL (ref 0–0.4)
BILIRUB SERPL-MCNC: 0.4 MG/DL (ref 0.2–1.3)
BUN SERPL-MCNC: 12 MG/DL (ref 7–20)
CALCIUM: 9.9 MG/DL (ref 8.4–10.2)
CHLORIDE SERPL-SCNC: 102 MMOL/L (ref 98–107)
CO2 SERPL-SCNC: 27 MMOL/L (ref 22–30)
CRP SERPL-MCNC: 5.3 MG/L (ref ?–10)
EOSINOPHIL # BLD AUTO: 0.3 10^3/UL (ref 0–0.6)
EOSINOPHIL NFR BLD AUTO: 4.9 % (ref 0–6)
ERYTHROCYTE [DISTWIDTH] IN BLOOD BY AUTOMATED COUNT: 17 % (ref 11.5–14)
ERYTHROCYTE [SEDIMENTATION RATE] IN BLOOD: 17 MM/HR (ref 0–20)
GLUCOSE SERPL-MCNC: 90 MG/DL (ref 75–110)
HCT VFR BLD CALC: 38.3 % (ref 36–47)
HGB BLD-MCNC: 12.4 G/DL (ref 12–15.5)
LYMPHOCYTES # BLD AUTO: 1.9 10^3/UL (ref 0.5–4.7)
LYMPHOCYTES NFR BLD AUTO: 31.3 % (ref 13–45)
MCH RBC QN AUTO: 24.6 PG (ref 27–33.4)
MCHC RBC AUTO-ENTMCNC: 32.3 G/DL (ref 32–36)
MCV RBC AUTO: 76 FL (ref 80–97)
MONOCYTES # BLD AUTO: 0.4 10^3/UL (ref 0.1–1.4)
MONOCYTES NFR BLD AUTO: 6.2 % (ref 3–13)
NEUTROPHILS # BLD AUTO: 3.4 10^3/UL (ref 1.7–8.2)
NEUTS SEG NFR BLD AUTO: 56.5 % (ref 42–78)
PLATELET # BLD: 337 10^3/UL (ref 150–450)
POTASSIUM SERPL-SCNC: 4.5 MMOL/L (ref 3.6–5)
PROT SERPL-MCNC: 8.5 G/DL (ref 6.3–8.2)
RBC # BLD AUTO: 5.02 10^6/UL (ref 3.72–5.28)
TOTAL CELLS COUNTED % (AUTO): 100 %
WBC # BLD AUTO: 6 10^3/UL (ref 4–10.5)

## 2020-01-22 PROCEDURE — 85025 COMPLETE CBC W/AUTO DIFF WBC: CPT

## 2020-01-22 PROCEDURE — 86038 ANTINUCLEAR ANTIBODIES: CPT

## 2020-01-22 PROCEDURE — 86430 RHEUMATOID FACTOR TEST QUAL: CPT

## 2020-01-22 PROCEDURE — 86140 C-REACTIVE PROTEIN: CPT

## 2020-01-22 PROCEDURE — 85652 RBC SED RATE AUTOMATED: CPT

## 2020-01-22 PROCEDURE — 36415 COLL VENOUS BLD VENIPUNCTURE: CPT

## 2020-01-22 PROCEDURE — 80053 COMPREHEN METABOLIC PANEL: CPT
